# Patient Record
Sex: FEMALE | Race: WHITE | Employment: OTHER | URBAN - NONMETROPOLITAN AREA
[De-identification: names, ages, dates, MRNs, and addresses within clinical notes are randomized per-mention and may not be internally consistent; named-entity substitution may affect disease eponyms.]

---

## 2017-01-10 ENCOUNTER — PATIENT OUTREACH (OUTPATIENT)
Dept: FAMILY MEDICINE CLINIC | Facility: CLINIC | Age: 82
End: 2017-01-10

## 2017-01-27 ENCOUNTER — OFFICE VISIT (OUTPATIENT)
Dept: FAMILY MEDICINE CLINIC | Facility: CLINIC | Age: 82
End: 2017-01-27

## 2017-01-27 VITALS
SYSTOLIC BLOOD PRESSURE: 126 MMHG | HEIGHT: 62 IN | HEART RATE: 70 BPM | DIASTOLIC BLOOD PRESSURE: 74 MMHG | WEIGHT: 191 LBS | RESPIRATION RATE: 12 BRPM | TEMPERATURE: 98 F | BODY MASS INDEX: 35.15 KG/M2

## 2017-01-27 DIAGNOSIS — Z00.00 ENCOUNTER FOR ANNUAL HEALTH EXAMINATION: Primary | ICD-10-CM

## 2017-01-27 DIAGNOSIS — Z13.31 DEPRESSION SCREENING: ICD-10-CM

## 2017-01-27 DIAGNOSIS — Z13.39 SCREENING FOR ALCOHOL PROBLEM: ICD-10-CM

## 2017-01-27 DIAGNOSIS — Z87.81 HISTORY OF FEMUR FRACTURE: ICD-10-CM

## 2017-01-27 DIAGNOSIS — M15.9 PRIMARY OSTEOARTHRITIS INVOLVING MULTIPLE JOINTS: ICD-10-CM

## 2017-01-27 PROBLEM — M19.90 OSTEOARTHRITIS: Status: ACTIVE | Noted: 2017-01-27

## 2017-01-27 PROCEDURE — G0439 PPPS, SUBSEQ VISIT: HCPCS | Performed by: FAMILY MEDICINE

## 2017-01-27 PROCEDURE — G0447 BEHAVIOR COUNSEL OBESITY 15M: HCPCS | Performed by: FAMILY MEDICINE

## 2017-01-27 RX ORDER — MELOXICAM 7.5 MG/1
7.5 TABLET ORAL DAILY
Qty: 30 TABLET | Refills: 0 | Status: SHIPPED | OUTPATIENT
Start: 2017-01-27 | End: 2017-02-27

## 2017-01-27 NOTE — PATIENT INSTRUCTIONS
Natasha Emmanuel's SCREENING SCHEDULE   Tests on this list are recommended by your physician but may not be covered, or covered at this frequency, by your insurer. Please check with your insurance carrier before scheduling to verify coverage.    JESUS Screen  Covered every 5 years No results found for this or any previous visit. No flowsheet data found. Fecal Occult Blood   Covered Annually No results found for: FOB, OCCULTSTOOL No flowsheet data found.      Barium Enema-   uncomfortable but covered (Pneumovax)  Covered Once after 65 No orders found for this or any previous visit. Please get once after your 65th birthday    Hepatitis B for Moderate/High Risk       No orders found for this or any previous visit.  Medium/high risk factors:   End-stage re

## 2017-01-27 NOTE — PROGRESS NOTES
HPI:   Amauri Cabrera is a 80year old female who presents for a Medicare Subsequent Annual Wellness visit (Pt already had Initial Annual Wellness).       Her last annual assessment has been over 1 year: Annual Physical due on 01/14/2017         Does ha Multiple Vitamin (MULTI-VITAMIN DAILY) Oral Tab Take by mouth. MEDICAL INFORMATION:   She  has a past medical history of Encounter for long-term (current) use of other medications (6/9/2015); Iron deficiency anemia (6/9/2015);  History of femur fract Healthcare Power of  on file in [facility]:    Aniya Coulter does not have a Power of  for [employer] on file in [address] [place] [address] Rd.  Discussed with patient and provided information          PLAN:  The patient indicates understanding of these issues and a 0-No    Does pain affect your day to day activities?: 1-Yes     Have you had any memory issues?: 0-No (slight )    Fall/Risk Scorin          Depression Screening (PHQ-2/PHQ-9): Over the LAST 2 WEEKS   Little interest or pleasure in doing things (over t Screening      Dexascan Every two years No results found for this or any previous visit. No flowsheet data found.     Pap and Pelvic      Pap: Every 3 yrs age 21-65 or Pap+HPV every 5 yrs age 33-67, age 72 and older at high risk There are no preventive care data found. No flowsheet data found. COPD      Spirometry Testing Annually Spirometry date:  No flowsheet data found.          Template: [facility] AMB MEDICARE ANNUAL ASSESSMENT FEMALE [02854]  Aniya Coulter was screened today and had CAGE screening score

## 2017-02-27 ENCOUNTER — TELEPHONE (OUTPATIENT)
Dept: FAMILY MEDICINE CLINIC | Facility: CLINIC | Age: 82
End: 2017-02-27

## 2017-02-27 RX ORDER — GABAPENTIN 100 MG/1
CAPSULE ORAL
Qty: 540 CAPSULE | Refills: 1 | Status: SHIPPED | OUTPATIENT
Start: 2017-02-27 | End: 2017-05-19

## 2017-02-27 RX ORDER — MELOXICAM 7.5 MG/1
7.5 TABLET ORAL DAILY
Qty: 30 TABLET | Refills: 0 | Status: SHIPPED | OUTPATIENT
Start: 2017-02-27 | End: 2017-11-22 | Stop reason: ALTCHOICE

## 2017-02-27 RX ORDER — ROPINIROLE 1 MG/1
1 TABLET, FILM COATED ORAL
Qty: 90 TABLET | Refills: 1 | Status: SHIPPED | OUTPATIENT
Start: 2017-02-27 | End: 2017-05-31

## 2017-02-27 NOTE — TELEPHONE ENCOUNTER
Last office visit: 1/27/17  Last refill: Gabapentin 9/1/16 # 540 with 1 refill                 Ropinirole 9/1/16 # 90 with 1 refills                 Meloxicam 1/27/17 # 30 with 0 refills

## 2017-02-28 NOTE — TELEPHONE ENCOUNTER
Patient notified that we do not have any copies of the paperwork and asked the patient to drop off another copy of the paper. Patient upset but voiced understanding.  Advised to keep a copy for her records, when she drops this off next time, she states the

## 2017-03-02 NOTE — TELEPHONE ENCOUNTER
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 Detailed message left for patient, notifying her that the letter is ready.

## 2017-03-02 NOTE — TELEPHONE ENCOUNTER
Paper was given to another provider. Left message for patient to notify her that the paperwork was found and placed for Dr. Kirk Poe to review and write hardship letter.

## 2017-05-02 ENCOUNTER — TELEPHONE (OUTPATIENT)
Dept: FAMILY MEDICINE CLINIC | Facility: CLINIC | Age: 82
End: 2017-05-02

## 2017-05-02 NOTE — TELEPHONE ENCOUNTER
States she has had bronchitis x several weeks. This morning woke up and upper right side of chest and shoulder hurt. Hurts constantly. \"It just hurts. \"  More of an ache than a sharp pain. A pressure. No jaw pain. Some shortness of breath.   No sweat

## 2017-05-04 ENCOUNTER — OFFICE VISIT (OUTPATIENT)
Dept: FAMILY MEDICINE CLINIC | Facility: CLINIC | Age: 82
End: 2017-05-04

## 2017-05-04 VITALS
SYSTOLIC BLOOD PRESSURE: 130 MMHG | RESPIRATION RATE: 14 BRPM | WEIGHT: 192.5 LBS | DIASTOLIC BLOOD PRESSURE: 70 MMHG | HEART RATE: 64 BPM | OXYGEN SATURATION: 98 % | BODY MASS INDEX: 35.43 KG/M2 | HEIGHT: 62 IN | TEMPERATURE: 98 F

## 2017-05-04 DIAGNOSIS — J18.9 CAP (COMMUNITY ACQUIRED PNEUMONIA): Primary | ICD-10-CM

## 2017-05-04 PROCEDURE — 99214 OFFICE O/P EST MOD 30 MIN: CPT | Performed by: FAMILY MEDICINE

## 2017-05-04 RX ORDER — PREDNISONE 20 MG/1
TABLET ORAL
Qty: 15 TABLET | Refills: 0 | Status: SHIPPED | OUTPATIENT
Start: 2017-05-04 | End: 2017-05-31 | Stop reason: ALTCHOICE

## 2017-05-04 RX ORDER — LEVOFLOXACIN 500 MG/1
TABLET, FILM COATED ORAL
COMMUNITY
Start: 2017-05-02 | End: 2017-05-31 | Stop reason: ALTCHOICE

## 2017-05-04 NOTE — PROGRESS NOTES
Lance Zavala is a 80year old female. Patient presents with:  ER F/U: [facility] er for pneumonia. [facility] room 6      HPI:   Patient called earlier this week and had chest pain with difficulty breathing.   Because of the acuteness of this [facility] 6/9/2015   • Primary osteoarthritis of left hip 6/9/2015   • Restless leg syndrome 8/3/7425   • Systolic murmur 0/8/7547      Social History:    Smoking Status: Never Smoker                      Smokeless Status: Never Used Subtle patchy groundglass infiltrates including left midlung and right upper lobe. Correlate for pneumonia. No evidence for pleural effusions. Dextroscoliosis spondylosis thoracic spine. Extensive degenerative changes both shoulders.  No acute osseous abno [facility]   [address]  [address]    Back to top of Lab Results      Comp Metabolic Panel (68/05/0647 9:50 AM)  Comp Metabolic Panel (28/68/3917 9:50 AM)   Component Value Ref Range   Glucose 86 70 - 99 mg/dL   Urea Nitrogen 26 (H CK-MB 3.5 0.5 - 3.5 ng/mL   Relative Index 4       CK-MB,Immunologic (05/02/2017 9:50 AM)   Specimen Performing Laboratory   Blood, [facility]    [address]  [address], [facility] [address]    Back to top of Lab Results      Troponin (05/ 0      Si tabs daily 5 days 1 tab daily 5 days                     Farhat Alejandro M.D., FAAFP      The patient indicates understanding of these issues and agrees to the plan.

## 2017-05-04 NOTE — PATIENT INSTRUCTIONS
Pneumonia (Adult)  Pneumonia is an infection deep within the lungs. It is in the small air sacs (alveoli). Pneumonia may be caused by a virus or bacteria. Pneumonia caused by bacteria is usually treated with an antibiotic.  Severe cases may need to be carmella If you are 72 or older, you should get a pneumococcal vaccine and a yearly flu (influenza) shot. You should also get these vaccines if you have chronic lung disease like asthma, emphysema, or COPD. Ask your provider about this.   When to seek medical advice

## 2017-05-19 ENCOUNTER — HOSPITAL ENCOUNTER (OUTPATIENT)
Dept: GENERAL RADIOLOGY | Age: 82
Discharge: HOME | End: 2017-05-19
Attending: FAMILY MEDICINE
Payer: MEDICARE

## 2017-05-19 ENCOUNTER — OFFICE VISIT (OUTPATIENT)
Dept: FAMILY MEDICINE CLINIC | Facility: CLINIC | Age: 82
End: 2017-05-19

## 2017-05-19 VITALS
SYSTOLIC BLOOD PRESSURE: 138 MMHG | BODY MASS INDEX: 36 KG/M2 | DIASTOLIC BLOOD PRESSURE: 74 MMHG | TEMPERATURE: 98 F | WEIGHT: 195.25 LBS

## 2017-05-19 DIAGNOSIS — J18.9 CAP (COMMUNITY ACQUIRED PNEUMONIA): Primary | ICD-10-CM

## 2017-05-19 DIAGNOSIS — J18.9 CAP (COMMUNITY ACQUIRED PNEUMONIA): ICD-10-CM

## 2017-05-19 DIAGNOSIS — G60.9 IDIOPATHIC PERIPHERAL NEUROPATHY: ICD-10-CM

## 2017-05-19 PROCEDURE — 71020 XR CHEST PA + LAT CHEST (CPT=71020): CPT | Performed by: FAMILY MEDICINE

## 2017-05-19 PROCEDURE — 99214 OFFICE O/P EST MOD 30 MIN: CPT | Performed by: FAMILY MEDICINE

## 2017-05-19 RX ORDER — GABAPENTIN 300 MG/1
300 CAPSULE ORAL 3 TIMES DAILY
COMMUNITY
Start: 2017-05-19 | End: 2017-05-31

## 2017-05-22 NOTE — PROGRESS NOTES
Brenden Ackerman is a 80year old female. Patient presents with:   Follow - Up: room 5      HPI:   Here as requested for follow  Had pneumonia  Is off of meds    Is improving well    No issues        Review of records from [facility] tablet (7.5 mg total) by mouth daily. Disp: 30 tablet Rfl: 0     No current facility-administered medications on file prior to visit.      Past Medical History   Diagnosis Date   • Encounter for long-term (current) use of other medications 6/9/2015   • Iron infiltrate      ASSESSMENT AND PLAN:     Cap (community acquired pneumonia)  (primary encounter diagnosis)  Idiopathic peripheral neuropathy    Problem List Items Addressed This Visit        Unprioritized    Peripheral neuropathy ([facility]    Relevant [facility]

## 2017-05-31 ENCOUNTER — OFFICE VISIT (OUTPATIENT)
Dept: FAMILY MEDICINE CLINIC | Facility: CLINIC | Age: 82
End: 2017-05-31

## 2017-05-31 VITALS
HEART RATE: 62 BPM | DIASTOLIC BLOOD PRESSURE: 68 MMHG | BODY MASS INDEX: 35 KG/M2 | WEIGHT: 194 LBS | SYSTOLIC BLOOD PRESSURE: 120 MMHG | RESPIRATION RATE: 20 BRPM | TEMPERATURE: 99 F | OXYGEN SATURATION: 95 %

## 2017-05-31 DIAGNOSIS — M16.12 PRIMARY OSTEOARTHRITIS OF LEFT HIP: ICD-10-CM

## 2017-05-31 DIAGNOSIS — G25.81 RESTLESS LEG SYNDROME: ICD-10-CM

## 2017-05-31 DIAGNOSIS — J01.90 ACUTE RHINOSINUSITIS: Primary | ICD-10-CM

## 2017-05-31 DIAGNOSIS — G60.9 IDIOPATHIC PERIPHERAL NEUROPATHY: ICD-10-CM

## 2017-05-31 DIAGNOSIS — R05.9 COUGH: ICD-10-CM

## 2017-05-31 DIAGNOSIS — Z87.01 HISTORY OF PNEUMONIA: ICD-10-CM

## 2017-05-31 PROCEDURE — 99213 OFFICE O/P EST LOW 20 MIN: CPT | Performed by: FAMILY MEDICINE

## 2017-05-31 RX ORDER — AMOXICILLIN AND CLAVULANATE POTASSIUM 875; 125 MG/1; MG/1
1 TABLET, FILM COATED ORAL 2 TIMES DAILY
Qty: 20 TABLET | Refills: 0 | Status: SHIPPED | OUTPATIENT
Start: 2017-05-31 | End: 2017-06-10

## 2017-05-31 RX ORDER — PREDNISONE 20 MG/1
TABLET ORAL
Qty: 15 TABLET | Refills: 0 | Status: SHIPPED | OUTPATIENT
Start: 2017-05-31 | End: 2017-06-19 | Stop reason: ALTCHOICE

## 2017-05-31 RX ORDER — HYDROCODONE BITARTRATE AND ACETAMINOPHEN 5; 325 MG/1; MG/1
1 TABLET ORAL 2 TIMES DAILY PRN
Qty: 60 TABLET | Refills: 0 | Status: SHIPPED | OUTPATIENT
Start: 2017-05-31 | End: 2017-10-13

## 2017-05-31 RX ORDER — GABAPENTIN 300 MG/1
300 CAPSULE ORAL 3 TIMES DAILY
Qty: 90 CAPSULE | Refills: 2 | Status: SHIPPED | OUTPATIENT
Start: 2017-05-31 | End: 2017-07-10

## 2017-05-31 RX ORDER — ROPINIROLE 1 MG/1
1 TABLET, FILM COATED ORAL
Qty: 90 TABLET | Refills: 1 | Status: SHIPPED | OUTPATIENT
Start: 2017-05-31 | End: 2017-10-13

## 2017-05-31 NOTE — PROGRESS NOTES
HPI:   Alexis Wisdom is a 80year old female who presents for upper respiratory symptoms for  4  days.  Patient reports sore throat, congestion, cough with clear colored sputum, sinus pain, OTC cold meds have not been helping, prior history of bronc pneumonia  Was better 2 weeks  Now recurred  With cough    CARDIOVASCULAR: denies chest pain on exertion  GI: no nausea or abdominal pain  NEURO: denies headaches    EXAM:   /68 mmHg  Pulse 62  Temp(Src) 99.3 °F (37.4 °C) (Temporal)  Resp 20  Wt 194 days 1 tab daily 5 days      gabapentin 300 MG Oral Cap 90 capsule 2      Sig: Take 1 capsule (300 mg total) by mouth 3 (three) times daily. rOPINIRole HCl 1 MG Oral Tab 90 tablet 1      Sig: Take 1 tablet (1 mg total) by mouth once daily.       HYDROc

## 2017-06-16 ENCOUNTER — TELEPHONE (OUTPATIENT)
Dept: FAMILY MEDICINE CLINIC | Facility: CLINIC | Age: 82
End: 2017-06-16

## 2017-06-16 NOTE — TELEPHONE ENCOUNTER
Future Appointments  Date Time Provider Danae Loya   6/19/2017 1:40 PM Roxane Olguin MD [facility] [place]

## 2017-06-19 ENCOUNTER — OFFICE VISIT (OUTPATIENT)
Dept: FAMILY MEDICINE CLINIC | Facility: CLINIC | Age: 82
End: 2017-06-19

## 2017-06-19 VITALS
WEIGHT: 194.5 LBS | DIASTOLIC BLOOD PRESSURE: 70 MMHG | TEMPERATURE: 99 F | BODY MASS INDEX: 36 KG/M2 | SYSTOLIC BLOOD PRESSURE: 120 MMHG

## 2017-06-19 DIAGNOSIS — M62.838 TRAPEZIUS MUSCLE SPASM: ICD-10-CM

## 2017-06-19 DIAGNOSIS — G89.29 CHRONIC RIGHT SHOULDER PAIN: Primary | ICD-10-CM

## 2017-06-19 DIAGNOSIS — M25.511 CHRONIC RIGHT SHOULDER PAIN: Primary | ICD-10-CM

## 2017-06-19 PROCEDURE — 99213 OFFICE O/P EST LOW 20 MIN: CPT | Performed by: FAMILY MEDICINE

## 2017-06-19 RX ORDER — CYCLOBENZAPRINE HCL 10 MG
10 TABLET ORAL 3 TIMES DAILY
Qty: 30 TABLET | Refills: 1 | Status: SHIPPED | OUTPATIENT
Start: 2017-06-19 | End: 2017-07-09

## 2017-06-19 NOTE — PROGRESS NOTES
Jie Sewell is a 80year old female. Patient presents with:  Neck Pain: Neck and shoulder pain. . noticed a few week ago. . room 6      HPI:   Here for eval  Has had pain  Wakes in the morning    Has pain daily  Chronic cramp  Looking for relief HEALTH: feels well no complaints  SKIN: denies any unusual skin lesions or rashes  MUSC  see HPI    NEURO: denies headaches    EXAM:   /70 mmHg  Temp(Src) 98.5 °F (36.9 °C) (Temporal)  Wt 194 lb 8 oz Body mass index is 35.57 kg/(m^2).       GENERAL: w

## 2017-06-22 NOTE — PATIENT INSTRUCTIONS
Neck Spasm    A spasm of the neck muscles can happen after a sudden awkward neck movement. Sleeping with your neck in a crooked position can also cause spasm.  Some people respond to emotional stress by tensing the muscles of their neck, shoulders, and up Follow up with your healthcare provider if your symptoms do not show signs of improvement after one week. Physical therapy or further tests may be needed.   If X-rays, CT scans, or MRI scans were taken, you will be told of any new findings that may affect y

## 2017-06-23 ENCOUNTER — TELEPHONE (OUTPATIENT)
Dept: FAMILY MEDICINE CLINIC | Facility: CLINIC | Age: 82
End: 2017-06-23

## 2017-07-07 ENCOUNTER — TELEPHONE (OUTPATIENT)
Dept: FAMILY MEDICINE CLINIC | Facility: CLINIC | Age: 82
End: 2017-07-07

## 2017-07-07 NOTE — TELEPHONE ENCOUNTER
Called all numbers listed for pt:  [phone] automated servis=ce states number is not a working number  [phone] is disconnected    Will try later

## 2017-07-10 ENCOUNTER — OFFICE VISIT (OUTPATIENT)
Dept: FAMILY MEDICINE CLINIC | Facility: CLINIC | Age: 82
End: 2017-07-10

## 2017-07-10 VITALS
TEMPERATURE: 99 F | OXYGEN SATURATION: 94 % | WEIGHT: 196 LBS | SYSTOLIC BLOOD PRESSURE: 120 MMHG | DIASTOLIC BLOOD PRESSURE: 68 MMHG | HEART RATE: 70 BPM | BODY MASS INDEX: 36 KG/M2

## 2017-07-10 DIAGNOSIS — I35.0 AORTIC STENOSIS, MILD: ICD-10-CM

## 2017-07-10 DIAGNOSIS — T50.905D MEDICATION SIDE EFFECT, SUBSEQUENT ENCOUNTER: Primary | ICD-10-CM

## 2017-07-10 DIAGNOSIS — N39.0 UTI DUE TO KLEBSIELLA SPECIES: ICD-10-CM

## 2017-07-10 DIAGNOSIS — R53.83 OTHER FATIGUE: ICD-10-CM

## 2017-07-10 DIAGNOSIS — G60.9 IDIOPATHIC PERIPHERAL NEUROPATHY: ICD-10-CM

## 2017-07-10 DIAGNOSIS — B96.89 UTI DUE TO KLEBSIELLA SPECIES: ICD-10-CM

## 2017-07-10 PROCEDURE — 99214 OFFICE O/P EST MOD 30 MIN: CPT | Performed by: FAMILY MEDICINE

## 2017-07-10 RX ORDER — GABAPENTIN 100 MG/1
100 CAPSULE ORAL 3 TIMES DAILY
Qty: 90 CAPSULE | Refills: 2 | Status: SHIPPED | OUTPATIENT
Start: 2017-07-10 | End: 2017-10-13

## 2017-07-10 NOTE — PATIENT INSTRUCTIONS
Gabapentin capsules or tablets  What is this medicine? GABAPENTIN (GA ba pen tin) is used to control partial seizures in adults with epilepsy. It is also used to treat certain types of nerve pain. How should I use this medicine?   Take this medicine by · certain medicines for anxiety or sleep  · certain medicines for depression or psychotic disturbances  · homatropine; hydrocodone  · naproxen  · narcotic medicines (opiates) for pain  · phenothiazines like chlorpromazine, mesoridazine, prochlorperazine, t Visit your doctor or health care professional for regular checks on your progress. You may want to keep a record at home of how you feel your condition is responding to treatment.  You may want to share this information with your doctor or health care profe NOTE:This sheet is a summary. It may not cover all possible information. If you have questions about this medicine, talk to your doctor, pharmacist, or health care provider.  Copyright© 2016 [employer]

## 2017-07-10 NOTE — PROGRESS NOTES
Brenda Trevizo is a 80year old female. Patient presents with:  Hospital F/U: weakness/fatigue. Brenda Spar discuss meds. .no appetite. .  dry mouth. . numbness in finger tips and wrist and left knee swelling. .  room 6      HPI:   Here to be evaluated, she is he aortic stenosis. There is mild aortic regurgitation. 6. There is mild mitral regurgitation. There is mitral annular  calcification. 7. There is mild tricuspid regurgitation. 8. There is no pericardial effusion.   9. There is evidence of pulmonary hypert Pericardium:  There is no pericardial effusion. Measurements   Chambers MM  Name Value Normal Range   IVSd (MM) . 50 cm -   LVIDd (MM) 6.14 cm -   LVPWd (MM) . 87 cm -   IVSs (MM) 1.55 cm -   LVIDs (MM) 4.31 cm -   LVPWs (MM) 1.43 cm -   EF Luis Rfl: 0   Cholecalciferol (VITAMIN D3) 1000 UNITS Oral Cap Take 1 tablet by mouth daily. Disp:  Rfl:    Multiple Vitamin (MULTI-VITAMIN DAILY) Oral Tab Take by mouth. Disp:  Rfl:      No current facility-administered medications on file prior to visit. side effect, subsequent encounter  (primary encounter diagnosis)  Uti due to klebsiella species  Aortic stenosis, mild  Other fatigue likely due to medication    Problem List Items Addressed This Visit        Neurologic    Peripheral neuropathy ([facility]    Re

## 2017-07-12 ENCOUNTER — MED REC SCAN ONLY (OUTPATIENT)
Dept: FAMILY MEDICINE CLINIC | Facility: CLINIC | Age: 82
End: 2017-07-12

## 2017-07-13 NOTE — TELEPHONE ENCOUNTER
Patient states that she stopped her Gabapentin and her symptoms have resolved and she feels much better. KYUNG to DR. Olguin.

## 2017-08-18 ENCOUNTER — LAB ENCOUNTER (OUTPATIENT)
Dept: LAB | Age: 82
End: 2017-08-18
Attending: FAMILY MEDICINE
Payer: MEDICARE

## 2017-08-18 ENCOUNTER — OFFICE VISIT (OUTPATIENT)
Dept: FAMILY MEDICINE CLINIC | Facility: CLINIC | Age: 82
End: 2017-08-18

## 2017-08-18 VITALS
BODY MASS INDEX: 34 KG/M2 | SYSTOLIC BLOOD PRESSURE: 138 MMHG | DIASTOLIC BLOOD PRESSURE: 64 MMHG | WEIGHT: 184.25 LBS | TEMPERATURE: 99 F

## 2017-08-18 DIAGNOSIS — R53.83 FATIGUE, UNSPECIFIED TYPE: Primary | ICD-10-CM

## 2017-08-18 DIAGNOSIS — R53.83 FATIGUE, UNSPECIFIED TYPE: ICD-10-CM

## 2017-08-18 DIAGNOSIS — N18.30 CKD (CHRONIC KIDNEY DISEASE) STAGE 3, GFR 30-59 ML/MIN (HCC): ICD-10-CM

## 2017-08-18 DIAGNOSIS — G60.9 IDIOPATHIC PERIPHERAL NEUROPATHY: ICD-10-CM

## 2017-08-18 DIAGNOSIS — M15.9 PRIMARY OSTEOARTHRITIS INVOLVING MULTIPLE JOINTS: ICD-10-CM

## 2017-08-18 LAB
25-HYDROXYVITAMIN D (TOTAL): 69.1 NG/ML (ref 30–100)
ALBUMIN SERPL-MCNC: 3.3 G/DL (ref 3.5–4.8)
ALP LIVER SERPL-CCNC: 76 U/L (ref 55–142)
ALT SERPL-CCNC: 20 U/L (ref 14–54)
AST SERPL-CCNC: 27 U/L (ref 15–41)
BASOPHILS # BLD AUTO: 0.05 X10(3) UL (ref 0–0.1)
BASOPHILS NFR BLD AUTO: 0.7 %
BILIRUB SERPL-MCNC: 0.5 MG/DL (ref 0.1–2)
BUN BLD-MCNC: 18 MG/DL (ref 8–20)
CALCIUM BLD-MCNC: 9.9 MG/DL (ref 8.3–10.3)
CHLORIDE: 105 MMOL/L (ref 101–111)
CO2: 26 MMOL/L (ref 22–32)
CREAT BLD-MCNC: 1.11 MG/DL (ref 0.55–1.02)
EOSINOPHIL # BLD AUTO: 0.19 X10(3) UL (ref 0–0.3)
EOSINOPHIL NFR BLD AUTO: 2.6 %
ERYTHROCYTE [DISTWIDTH] IN BLOOD BY AUTOMATED COUNT: 14.9 % (ref 11.5–16)
GLUCOSE BLD-MCNC: 78 MG/DL (ref 70–99)
HAV AB SERPL IA-ACNC: 1383 PG/ML (ref 193–986)
HCT VFR BLD AUTO: 37.1 % (ref 34–50)
HGB BLD-MCNC: 11.7 G/DL (ref 12–16)
IMMATURE GRANULOCYTE COUNT: 0.01 X10(3) UL (ref 0–1)
IMMATURE GRANULOCYTE RATIO %: 0.1 %
LYMPHOCYTES # BLD AUTO: 1.44 X10(3) UL (ref 0.9–4)
LYMPHOCYTES NFR BLD AUTO: 20.1 %
M PROTEIN MFR SERPL ELPH: 7.1 G/DL (ref 6.1–8.3)
MCH RBC QN AUTO: 32.2 PG (ref 27–33.2)
MCHC RBC AUTO-ENTMCNC: 31.5 G/DL (ref 31–37)
MCV RBC AUTO: 102.2 FL (ref 81–100)
MONOCYTES # BLD AUTO: 0.54 X10(3) UL (ref 0.1–0.6)
MONOCYTES NFR BLD AUTO: 7.5 %
NEUTROPHIL ABS PRELIM: 4.95 X10 (3) UL (ref 1.3–6.7)
NEUTROPHILS # BLD AUTO: 4.95 X10(3) UL (ref 1.3–6.7)
NEUTROPHILS NFR BLD AUTO: 69 %
PLATELET # BLD AUTO: 230 10(3)UL (ref 150–450)
POTASSIUM SERPL-SCNC: 4.2 MMOL/L (ref 3.6–5.1)
RBC # BLD AUTO: 3.63 X10(6)UL (ref 3.8–5.1)
RED CELL DISTRIBUTION WIDTH-SD: 56.5 FL (ref 35.1–46.3)
SODIUM SERPL-SCNC: 139 MMOL/L (ref 136–144)
TSI SER-ACNC: 1.11 MIU/ML (ref 0.35–5.5)
WBC # BLD AUTO: 7.2 X10(3) UL (ref 4–13)

## 2017-08-18 PROCEDURE — 84443 ASSAY THYROID STIM HORMONE: CPT

## 2017-08-18 PROCEDURE — 36415 COLL VENOUS BLD VENIPUNCTURE: CPT

## 2017-08-18 PROCEDURE — 82607 VITAMIN B-12: CPT

## 2017-08-18 PROCEDURE — 80053 COMPREHEN METABOLIC PANEL: CPT

## 2017-08-18 PROCEDURE — 82306 VITAMIN D 25 HYDROXY: CPT

## 2017-08-18 PROCEDURE — 99214 OFFICE O/P EST MOD 30 MIN: CPT | Performed by: FAMILY MEDICINE

## 2017-08-18 PROCEDURE — 85025 COMPLETE CBC W/AUTO DIFF WBC: CPT

## 2017-08-21 NOTE — PATIENT INSTRUCTIONS
Osteoarthritis: Tips for Daily Living     Lift items with both hands. Making a few changes in your daily life can reduce stress on your joints. This helps protect the joints from further damage.   Your surroundings  Make your home work for you:  · Alondra Chow © 0011-0227 [address], [address]. All rights reserved. This information is not intended as a substitute for professional medical care. Always follow your healthcare professional's instructions.

## 2017-08-21 NOTE — PROGRESS NOTES
Kayden Ritter is a 80year old female. Patient presents with:   Follow - Up: room 1      HPI:   Here for follow    Now feels well  Though she is tired    She states she has diff waking in the norning    But she has energy during the day    She stat oz  05/31/17 : 194 lb  05/19/17 : 195 lb 4 oz  05/04/17 : 192 lb 8 oz      REVIEW OF SYSTEMS:   GENERAL HEALTH: feels well no complaints  SKIN: denies any unusual skin lesions or rashes  RESPIRATORY: denies shortness of breath with exertion  CARDIOVASCULAR orders of the defined types were placed in this encounter.         Orders Placed This Encounter      CBC With Diff      CMP      TSH W Reflex To Free T4      Vitamin D, 25-Hydroxy      Vitamin B12            Meds & Refills for this Visit:  No prescriptions

## 2017-09-11 ENCOUNTER — MED REC SCAN ONLY (OUTPATIENT)
Dept: FAMILY MEDICINE CLINIC | Facility: CLINIC | Age: 82
End: 2017-09-11

## 2017-10-13 DIAGNOSIS — M16.12 PRIMARY OSTEOARTHRITIS OF LEFT HIP: ICD-10-CM

## 2017-10-13 DIAGNOSIS — G60.9 IDIOPATHIC PERIPHERAL NEUROPATHY: ICD-10-CM

## 2017-10-13 DIAGNOSIS — G25.81 RESTLESS LEG SYNDROME: ICD-10-CM

## 2017-10-13 RX ORDER — GABAPENTIN 100 MG/1
100 CAPSULE ORAL 3 TIMES DAILY
Qty: 90 CAPSULE | Refills: 2 | Status: SHIPPED | OUTPATIENT
Start: 2017-10-13 | End: 2020-04-30

## 2017-10-13 RX ORDER — HYDROCODONE BITARTRATE AND ACETAMINOPHEN 5; 325 MG/1; MG/1
1 TABLET ORAL 2 TIMES DAILY PRN
Qty: 60 TABLET | Refills: 0 | Status: SHIPPED | OUTPATIENT
Start: 2017-10-13 | End: 2018-03-09

## 2017-10-13 RX ORDER — ROPINIROLE 1 MG/1
1 TABLET, FILM COATED ORAL
Qty: 90 TABLET | Refills: 1 | Status: SHIPPED | OUTPATIENT
Start: 2017-10-13 | End: 2018-07-16

## 2017-10-13 NOTE — TELEPHONE ENCOUNTER
Last OV: 8/18/2017  Ropinirole: 5/31/2017 #90 w/ 1RF  Norco: 5/31/2017 #60 no RF  Gabapentin: 7/10/2017 #90 w/ 2RF

## 2017-10-13 NOTE — TELEPHONE ENCOUNTER
Patient notified and verbalized understanding of the information provided  Script placed up front for

## 2017-10-17 ENCOUNTER — IMMUNIZATION (OUTPATIENT)
Dept: FAMILY MEDICINE CLINIC | Facility: CLINIC | Age: 82
End: 2017-10-17

## 2017-10-17 PROCEDURE — 90653 IIV ADJUVANT VACCINE IM: CPT | Performed by: FAMILY MEDICINE

## 2017-10-17 PROCEDURE — G0008 ADMIN INFLUENZA VIRUS VAC: HCPCS | Performed by: FAMILY MEDICINE

## 2017-11-20 ENCOUNTER — TELEPHONE (OUTPATIENT)
Dept: FAMILY MEDICINE CLINIC | Facility: CLINIC | Age: 82
End: 2017-11-20

## 2017-11-20 NOTE — TELEPHONE ENCOUNTER
Advised pt that she has refills remaining at the pharmacy and that she would need to contact them in regards to the medications

## 2017-11-22 ENCOUNTER — OFFICE VISIT (OUTPATIENT)
Dept: FAMILY MEDICINE CLINIC | Facility: CLINIC | Age: 82
End: 2017-11-22

## 2017-11-22 VITALS
WEIGHT: 189 LBS | SYSTOLIC BLOOD PRESSURE: 124 MMHG | TEMPERATURE: 100 F | HEART RATE: 105 BPM | BODY MASS INDEX: 35 KG/M2 | OXYGEN SATURATION: 96 % | DIASTOLIC BLOOD PRESSURE: 60 MMHG

## 2017-11-22 DIAGNOSIS — J40 BRONCHITIS: ICD-10-CM

## 2017-11-22 DIAGNOSIS — J01.90 ACUTE RHINOSINUSITIS: ICD-10-CM

## 2017-11-22 DIAGNOSIS — R06.02 SHORTNESS OF BREATH: Primary | ICD-10-CM

## 2017-11-22 DIAGNOSIS — R05.9 COUGH: ICD-10-CM

## 2017-11-22 PROCEDURE — 99214 OFFICE O/P EST MOD 30 MIN: CPT | Performed by: FAMILY MEDICINE

## 2017-11-22 RX ORDER — PREDNISONE 20 MG/1
TABLET ORAL
Qty: 15 TABLET | Refills: 0 | Status: SHIPPED | OUTPATIENT
Start: 2017-11-22 | End: 2018-01-16 | Stop reason: ALTCHOICE

## 2017-11-22 RX ORDER — CEFPROZIL 250 MG/1
250 TABLET, FILM COATED ORAL 2 TIMES DAILY
Qty: 20 TABLET | Refills: 0 | Status: SHIPPED | OUTPATIENT
Start: 2017-11-22 | End: 2018-01-16 | Stop reason: ALTCHOICE

## 2017-11-22 NOTE — PROGRESS NOTES
Saw Medina is a 80year old female. Patient presents with: Other: cough, chest congestion-has been taking mucinex-started on 11/18. ...room 1      HPI:   Patient was brought in by her daughter.   Patient complains of cough, congestion, shortness or abdominal pain   NEURO: denies headaches    EXAM:   /60   Pulse 105   Temp 100 °F (37.8 °C) (Tympanic)   Wt 189 lb   SpO2 96%   BMI 34.57 kg/m²   GENERAL: well developed, well nourished,in no apparent distress  SKIN: no rashes,no suspicious lesion

## 2018-01-16 ENCOUNTER — OFFICE VISIT (OUTPATIENT)
Dept: FAMILY MEDICINE CLINIC | Facility: CLINIC | Age: 83
End: 2018-01-16

## 2018-01-16 VITALS
WEIGHT: 194 LBS | BODY MASS INDEX: 35 KG/M2 | DIASTOLIC BLOOD PRESSURE: 70 MMHG | HEART RATE: 58 BPM | SYSTOLIC BLOOD PRESSURE: 128 MMHG | OXYGEN SATURATION: 98 % | TEMPERATURE: 98 F

## 2018-01-16 DIAGNOSIS — J01.90 ACUTE RHINOSINUSITIS: Primary | ICD-10-CM

## 2018-01-16 DIAGNOSIS — R05.9 COUGH: ICD-10-CM

## 2018-01-16 PROCEDURE — 99213 OFFICE O/P EST LOW 20 MIN: CPT | Performed by: FAMILY MEDICINE

## 2018-01-16 RX ORDER — LEVOFLOXACIN 500 MG/1
500 TABLET, FILM COATED ORAL DAILY
Qty: 10 TABLET | Refills: 0 | Status: SHIPPED | OUTPATIENT
Start: 2018-01-16 | End: 2018-01-24

## 2018-01-16 RX ORDER — GABAPENTIN 100 MG/1
100 CAPSULE ORAL 3 TIMES DAILY
Qty: 90 CAPSULE | Refills: 5 | Status: SHIPPED | OUTPATIENT
Start: 2018-01-16 | End: 2018-06-22

## 2018-01-16 RX ORDER — GABAPENTIN 100 MG/1
100 CAPSULE ORAL 3 TIMES DAILY
COMMUNITY
End: 2018-01-16

## 2018-01-16 RX ORDER — PREDNISONE 20 MG/1
TABLET ORAL
Qty: 15 TABLET | Refills: 0 | Status: SHIPPED | OUTPATIENT
Start: 2018-01-16 | End: 2018-01-29 | Stop reason: ALTCHOICE

## 2018-01-16 NOTE — PROGRESS NOTES
HPI:   Aretha Monterroso is a 80year old female who presents for upper respiratory symptoms for  4  days.  Patient reports sore throat, congestion, cough with yellowish colored sputum, cough is keeping pt up at night, wheezing, OTC cold meds have not b shortness of breath with exertion  see HPI    CARDIOVASCULAR: denies chest pain on exertion  GI: no nausea or abdominal pain  NEURO: denies headaches    EXAM:   /70   Pulse 58   Temp 98.3 °F (36.8 °C) (Temporal)   Wt 194 lb   SpO2 98%   BMI 35.48 kg/

## 2018-01-24 ENCOUNTER — TELEPHONE (OUTPATIENT)
Dept: FAMILY MEDICINE CLINIC | Facility: CLINIC | Age: 83
End: 2018-01-24

## 2018-01-24 DIAGNOSIS — J01.90 ACUTE RHINOSINUSITIS: ICD-10-CM

## 2018-01-24 DIAGNOSIS — R05.9 COUGH: ICD-10-CM

## 2018-01-24 RX ORDER — LEVOFLOXACIN 500 MG/1
500 TABLET, FILM COATED ORAL DAILY
Qty: 10 TABLET | Refills: 0 | Status: SHIPPED | OUTPATIENT
Start: 2018-01-24 | End: 2018-01-29 | Stop reason: ALTCHOICE

## 2018-01-24 NOTE — TELEPHONE ENCOUNTER
Pt states that she started to feel better but now feels the symptoms are coming back. Pt states \"I think a refill will help me\". Pt is c/o congestions, hoarseness w/ a mild ST. Pt denies: fever, productive cough, difficulty breathing or wheezing.  Pt stat

## 2018-01-29 ENCOUNTER — OFFICE VISIT (OUTPATIENT)
Dept: FAMILY MEDICINE CLINIC | Facility: CLINIC | Age: 83
End: 2018-01-29

## 2018-01-29 VITALS
WEIGHT: 194.25 LBS | DIASTOLIC BLOOD PRESSURE: 60 MMHG | HEART RATE: 64 BPM | BODY MASS INDEX: 36 KG/M2 | TEMPERATURE: 99 F | OXYGEN SATURATION: 98 % | SYSTOLIC BLOOD PRESSURE: 120 MMHG

## 2018-01-29 DIAGNOSIS — Z00.00 ENCOUNTER FOR ANNUAL HEALTH EXAMINATION: ICD-10-CM

## 2018-01-29 DIAGNOSIS — Z23 NEED FOR VACCINATION: ICD-10-CM

## 2018-01-29 PROCEDURE — 90732 PPSV23 VACC 2 YRS+ SUBQ/IM: CPT | Performed by: FAMILY MEDICINE

## 2018-01-29 PROCEDURE — G0009 ADMIN PNEUMOCOCCAL VACCINE: HCPCS | Performed by: FAMILY MEDICINE

## 2018-01-29 PROCEDURE — G0439 PPPS, SUBSEQ VISIT: HCPCS | Performed by: FAMILY MEDICINE

## 2018-01-29 NOTE — PATIENT INSTRUCTIONS
Tony Emmanuel's SCREENING SCHEDULE   Tests on this list are recommended by your physician but may not be covered, or covered at this frequency, by your insurer. Please check with your insurance carrier before scheduling to verify coverage.    PREV applicable    Flex Sigmoidoscopy Screen  Covered every 5 years No results found for this or any previous visit. No flowsheet data found. Fecal Occult Blood   Covered Annually No results found for: FOB, OCCULTSTOOL No flowsheet data found.      Barium En IM    Please get once after your 65th birthday    Pneumococcal 23 (Pneumovax)  Covered Once after 65   Orders placed or performed in visit on 01/29/18  -PNEUMOCOCCAL IMM (PNEUMOVAX)    Please get once after your 65th birthday    Hepatitis B for Moderate/Hi

## 2018-01-31 ENCOUNTER — TELEPHONE (OUTPATIENT)
Dept: FAMILY MEDICINE CLINIC | Facility: CLINIC | Age: 83
End: 2018-01-31

## 2018-01-31 DIAGNOSIS — R05.9 COUGH: ICD-10-CM

## 2018-01-31 DIAGNOSIS — J01.90 ACUTE RHINOSINUSITIS: ICD-10-CM

## 2018-01-31 RX ORDER — LEVOFLOXACIN 500 MG/1
500 TABLET, FILM COATED ORAL DAILY
Qty: 10 TABLET | Refills: 0 | Status: SHIPPED | OUTPATIENT
Start: 2018-01-31 | End: 2018-02-10

## 2018-01-31 NOTE — TELEPHONE ENCOUNTER
SEEN ON Monday AND THOUGHT HE WAS GOING TO SEND IN SCRIPT TO [facility] FOR LEVOTHYROXINE? [facility] DOES NOT HAVE ANYTHING FOR HER.   PLEASE ADVISE

## 2018-01-31 NOTE — TELEPHONE ENCOUNTER
Pt states that Dr. Anna العلي was going to call in a refill for levaquin but the pharmacy does not have the refill  Dr. Anna العلي advised and v/o to send refill of Levaquin  Med sent  Patient notified and verbalized understanding of the information provided

## 2018-03-09 DIAGNOSIS — M16.12 PRIMARY OSTEOARTHRITIS OF LEFT HIP: ICD-10-CM

## 2018-03-09 RX ORDER — HYDROCODONE BITARTRATE AND ACETAMINOPHEN 5; 325 MG/1; MG/1
1 TABLET ORAL 2 TIMES DAILY PRN
Qty: 60 TABLET | Refills: 0 | Status: SHIPPED | OUTPATIENT
Start: 2018-03-09 | End: 2018-06-28

## 2018-03-09 NOTE — TELEPHONE ENCOUNTER
[facility] QUER[facility] AND VERIFIED TODAY

## 2018-04-20 ENCOUNTER — OFFICE VISIT (OUTPATIENT)
Dept: FAMILY MEDICINE CLINIC | Facility: CLINIC | Age: 83
End: 2018-04-20

## 2018-04-20 VITALS
SYSTOLIC BLOOD PRESSURE: 136 MMHG | HEART RATE: 64 BPM | OXYGEN SATURATION: 98 % | BODY MASS INDEX: 34 KG/M2 | DIASTOLIC BLOOD PRESSURE: 70 MMHG | WEIGHT: 188 LBS | TEMPERATURE: 98 F

## 2018-04-20 DIAGNOSIS — R82.81 PYURIA: Primary | ICD-10-CM

## 2018-04-20 DIAGNOSIS — R30.0 DYSURIA: ICD-10-CM

## 2018-04-20 PROCEDURE — 87086 URINE CULTURE/COLONY COUNT: CPT | Performed by: FAMILY MEDICINE

## 2018-04-20 PROCEDURE — 87077 CULTURE AEROBIC IDENTIFY: CPT | Performed by: FAMILY MEDICINE

## 2018-04-20 PROCEDURE — 99213 OFFICE O/P EST LOW 20 MIN: CPT | Performed by: FAMILY MEDICINE

## 2018-04-20 PROCEDURE — 81003 URINALYSIS AUTO W/O SCOPE: CPT | Performed by: FAMILY MEDICINE

## 2018-04-20 RX ORDER — SULFAMETHOXAZOLE AND TRIMETHOPRIM 800; 160 MG/1; MG/1
1 TABLET ORAL 2 TIMES DAILY
Qty: 14 TABLET | Refills: 0 | Status: SHIPPED | OUTPATIENT
Start: 2018-04-20 | End: 2018-04-27

## 2018-04-20 NOTE — PROGRESS NOTES
Ha Erickson is a 80year old female. Patient presents with:  Burning On Urination: urgency with urination, burning also. . started a few weeks ago. .. room 6    Subjective   HPI:   Has 1-2 weeks that she has been dealing with some urgency with uri : 120/60  01/16/18 : 128/70  11/22/17 : 124/60  08/18/17 : 138/64  07/10/17 : 120/68      Wt Readings from Last 6 Encounters:  04/20/18 : 188 lb  01/29/18 : 194 lb 4 oz  01/16/18 : 194 lb  11/22/17 : 189 lb  08/18/17 : 184 lb 4 oz  07/10/17 : 196 lb      R UROBILINOGEN,SEMI-QN 0.2 0.0 - 1.9 mg/dL   NITRITE, URINE Negative Negative   LEUKOCYTES Large Negative   APPEARANCE Clear Clear   URINE-COLOR Yellow Yellow   Multistix Lot# [id number] Numeric   Multistix Expiration Date 9/18 Date         Return if symptoms

## 2018-04-23 ENCOUNTER — TELEPHONE (OUTPATIENT)
Dept: FAMILY MEDICINE CLINIC | Facility: CLINIC | Age: 83
End: 2018-04-23

## 2018-04-23 RX ORDER — CEPHALEXIN 250 MG/1
250 CAPSULE ORAL 2 TIMES DAILY
Qty: 20 CAPSULE | Refills: 0 | Status: SHIPPED | OUTPATIENT
Start: 2018-04-23 | End: 2018-06-28 | Stop reason: ALTCHOICE

## 2018-04-23 NOTE — TELEPHONE ENCOUNTER
Pt states that she is having terrible weakness from the Bactrim DS. Per Dr. Olguin: v/o for keflex 250mg BID x 10 days.    Patient notified and verbalized understanding of the information provided

## 2018-05-21 ENCOUNTER — TELEPHONE (OUTPATIENT)
Dept: FAMILY MEDICINE CLINIC | Facility: CLINIC | Age: 83
End: 2018-05-21

## 2018-05-21 NOTE — TELEPHONE ENCOUNTER
Advised pt she can come in anytime for a nurse appointment.  Patient notified and verbalized understanding of the information provided  Future Appointments  Date Time Provider Danae Loya   5/22/2018 9:00 AM [facility] [place]

## 2018-05-21 NOTE — TELEPHONE ENCOUNTER
UTI, CAN SHE BE SEEN TOMORROW AROUND 9:30?  NO OPENINGS, BUT SHE NEEDS TO GET A RIDE FROM THE BUS & THIS TIME WOULD WORK WELL

## 2018-05-22 ENCOUNTER — NURSE ONLY (OUTPATIENT)
Dept: FAMILY MEDICINE CLINIC | Facility: CLINIC | Age: 83
End: 2018-05-22

## 2018-05-22 DIAGNOSIS — R35.0 FREQUENCY OF URINATION: Primary | ICD-10-CM

## 2018-05-22 PROCEDURE — 87086 URINE CULTURE/COLONY COUNT: CPT | Performed by: FAMILY MEDICINE

## 2018-05-22 PROCEDURE — 81003 URINALYSIS AUTO W/O SCOPE: CPT | Performed by: FAMILY MEDICINE

## 2018-05-22 PROCEDURE — 87077 CULTURE AEROBIC IDENTIFY: CPT | Performed by: FAMILY MEDICINE

## 2018-05-22 RX ORDER — NITROFURANTOIN 25; 75 MG/1; MG/1
100 CAPSULE ORAL 2 TIMES DAILY
Qty: 20 CAPSULE | Refills: 0 | Status: SHIPPED | OUTPATIENT
Start: 2018-05-22 | End: 2018-06-28 | Stop reason: ALTCHOICE

## 2018-06-04 ENCOUNTER — TELEPHONE (OUTPATIENT)
Dept: FAMILY MEDICINE CLINIC | Facility: CLINIC | Age: 83
End: 2018-06-04

## 2018-06-04 NOTE — TELEPHONE ENCOUNTER
Jevon at [facility] advised ok to change per Dr. Vinod Mistry.  Pb Sierra read back order for clarity

## 2018-06-04 NOTE — TELEPHONE ENCOUNTER
New admit to nursing home. Came from hospital with orders for Keeler 5/325 1 tab. Patient had been taking Norco 5/325 1/2 tablet prn when she was at home.  Haroon Babcock is requesting a revised order for the 1/2 tablet

## 2018-06-05 ENCOUNTER — TELEPHONE (OUTPATIENT)
Dept: FAMILY MEDICINE CLINIC | Facility: CLINIC | Age: 83
End: 2018-06-05

## 2018-06-05 NOTE — TELEPHONE ENCOUNTER
Senthil Torres at [facility] advised and verbalized understanding of the information provided

## 2018-06-05 NOTE — TELEPHONE ENCOUNTER
SONJA IS MENTALLY READY TO \"GIVE UP\" GOING TO THE BATHROOM EVERY HOUR, ANA THINKS SHE HAS A UTI. ANA WOULD LIKE TO SPEAK TO THE NURSE.   HE ALSO WANTS TO KNOW WHEN DR TESFAYE COULD GET UP TO SEE HER AT [facility]

## 2018-06-05 NOTE — TELEPHONE ENCOUNTER
Fax received from [facility]: [place], RN: May we have an order for UA and C&S. Pt is having frequent urination every 30 minutes with pain and small amounts. [id number] Bren Calero for requested labs? ?      Pt was admitted to [facility] after discharge from [facility] [address] for UTI

## 2018-06-05 NOTE — TELEPHONE ENCOUNTER
Spoke to son, Ceasar Carvalho, who is very concerned about pt having another UTI. Ceasar Carvalho states that pt is urinating every 30 minutes and is c/o pain. Alison Cashs that an order was placed for a UA and C&S and that once results are received we.  Ceasar Carvalho is more concern

## 2018-06-08 ENCOUNTER — TELEPHONE (OUTPATIENT)
Dept: FAMILY MEDICINE CLINIC | Facility: CLINIC | Age: 83
End: 2018-06-08

## 2018-06-08 NOTE — TELEPHONE ENCOUNTER
Son  calls states she is in nursing home, [address]. She was seen @ [facility]/ today for some kind of urinary diagnostic test ordered by Dr. Eulalia Kamara. Margaux Jj states she is still having a problem with urinary frequency, apparently has to urinate hourly.  Advised

## 2018-06-11 ENCOUNTER — TELEPHONE (OUTPATIENT)
Dept: FAMILY MEDICINE CLINIC | Facility: CLINIC | Age: 83
End: 2018-06-11

## 2018-06-11 RX ORDER — OXYBUTYNIN CHLORIDE 10 MG/1
10 TABLET, EXTENDED RELEASE ORAL DAILY
Qty: 30 TABLET | Refills: 0 | COMMUNITY
Start: 2018-06-11 | End: 2018-09-21

## 2018-06-11 RX ORDER — ACETAMINOPHEN / DIPHENHYDRAMINE 25; 500 MG/1; MG/1
TABLET ORAL
Qty: 30 TABLET | Refills: 0 | COMMUNITY
Start: 2018-06-11 | End: 2019-02-22 | Stop reason: ALTCHOICE

## 2018-06-11 NOTE — TELEPHONE ENCOUNTER
Note from Don Milner R.N. Sends fax requesting order for Tylenol PM. Apparently she is awake frequently during the night due to urinary frequency. Urine has been tested, no infection. Dajuan Alegria attempted to contact Dr. Geri Kwon, he is out of the office.  She s

## 2018-06-18 ENCOUNTER — TELEPHONE (OUTPATIENT)
Dept: FAMILY MEDICINE CLINIC | Facility: CLINIC | Age: 83
End: 2018-06-18

## 2018-06-18 NOTE — TELEPHONE ENCOUNTER
Augustine Mullen at [facility] advised and read back order for clarity

## 2018-06-18 NOTE — TELEPHONE ENCOUNTER
Fax received from [facility]: Can we have an order to dc home on 6/20/2018 with home health and PT

## 2018-06-22 ENCOUNTER — TELEPHONE (OUTPATIENT)
Dept: FAMILY MEDICINE CLINIC | Facility: CLINIC | Age: 83
End: 2018-06-22

## 2018-06-22 DIAGNOSIS — M25.569 ACUTE KNEE PAIN, UNSPECIFIED LATERALITY: Primary | ICD-10-CM

## 2018-06-22 RX ORDER — GABAPENTIN 100 MG/1
200 CAPSULE ORAL 3 TIMES DAILY
Qty: 180 CAPSULE | Refills: 5 | Status: SHIPPED | OUTPATIENT
Start: 2018-06-22 | End: 2019-01-14

## 2018-06-22 NOTE — TELEPHONE ENCOUNTER
COMPLAINING OF PAIN, ASKING IF SHE CAN TAKE OTC ALEVE WITHOUT INTERACTIONS WITH OTHER MEDICATIONS?     ALSO ASKING FOR KNEE BRACE DUE TO KNEE PAIN IF WOULD BE COVERED BY MEDICARE    COMPLAINING OF NEUROPOTHY OF LEGS, CURRENTLY TAKING GABAPENTIN 100MG TID, A

## 2018-06-22 NOTE — TELEPHONE ENCOUNTER
OK for all of below. Can increase Gabapentin to 200mg TID if she can tolerate. V/o Dr. Evelyn Rosales advised and verbalizes understanding. Danielle Darby. Needs all sent to [facility].

## 2018-06-27 ENCOUNTER — TELEPHONE (OUTPATIENT)
Dept: FAMILY MEDICINE CLINIC | Facility: CLINIC | Age: 83
End: 2018-06-27

## 2018-06-27 NOTE — TELEPHONE ENCOUNTER
Serafin Pina advised and verbalized understand of the information provided  Future Appointments  Date Time Provider Danae Shalini   6/28/2018 9:00 AM Antonella Whalen MD [facility] [place]     Med was not d/c off med list

## 2018-06-27 NOTE — TELEPHONE ENCOUNTER
Pts. Physical therapist called re: her blood pressure. He will not be at her house much longer but the pt. Will be able to explain her concerns with her BP.

## 2018-06-27 NOTE — TELEPHONE ENCOUNTER
Pt states that when she moves around or does PT, her BP can go up to 160/80. Pt did not c/o any symptoms. Advised pt that she would need to be seen for the BP issues. Appointments offered but pt needs to call son for ride.  Will call back

## 2018-06-27 NOTE — TELEPHONE ENCOUNTER
Daughter in law called back to say that pt's was recently started on oxybutynin and this is when the high BP, dizziness and ankle swelling started. Advised that I was unaware of the high BP but not the other symptoms.  Daughter in law denies: severe HA but

## 2018-06-28 ENCOUNTER — OFFICE VISIT (OUTPATIENT)
Dept: FAMILY MEDICINE CLINIC | Facility: CLINIC | Age: 83
End: 2018-06-28

## 2018-06-28 VITALS
HEART RATE: 60 BPM | OXYGEN SATURATION: 97 % | DIASTOLIC BLOOD PRESSURE: 70 MMHG | SYSTOLIC BLOOD PRESSURE: 140 MMHG | TEMPERATURE: 98 F | BODY MASS INDEX: 34 KG/M2 | WEIGHT: 186 LBS

## 2018-06-28 DIAGNOSIS — R35.0 URINARY FREQUENCY: ICD-10-CM

## 2018-06-28 DIAGNOSIS — R53.83 FATIGUE, UNSPECIFIED TYPE: ICD-10-CM

## 2018-06-28 DIAGNOSIS — R68.2 DRY MOUTH: ICD-10-CM

## 2018-06-28 DIAGNOSIS — G44.89 OTHER HEADACHE SYNDROME: Primary | ICD-10-CM

## 2018-06-28 DIAGNOSIS — M16.12 PRIMARY OSTEOARTHRITIS OF LEFT HIP: ICD-10-CM

## 2018-06-28 DIAGNOSIS — R60.0 BILATERAL LEG EDEMA: ICD-10-CM

## 2018-06-28 DIAGNOSIS — R42 DIZZINESS: ICD-10-CM

## 2018-06-28 PROCEDURE — 99214 OFFICE O/P EST MOD 30 MIN: CPT | Performed by: FAMILY MEDICINE

## 2018-06-28 RX ORDER — HYDROCODONE BITARTRATE AND ACETAMINOPHEN 5; 325 MG/1; MG/1
1 TABLET ORAL 2 TIMES DAILY PRN
Qty: 60 TABLET | Refills: 0 | Status: SHIPPED | OUTPATIENT
Start: 2018-06-28 | End: 2018-08-20

## 2018-06-28 NOTE — TELEPHONE ENCOUNTER
[facility] printed and [facility] QUERIED AND VERIFIED TODAY

## 2018-06-28 NOTE — TELEPHONE ENCOUNTER
Last OV 6/28/2018  Last filled: 3/9/2018 #60 no RF    Call Aisha Flanagan (son) on cell when ready.  Son will

## 2018-06-30 NOTE — PATIENT INSTRUCTIONS
Oxybutynin extended-release tablets  Brand Name: Ditropan XL  What is this medicine? OXYBUTYNIN (ox i BYOO ti otilio) is used to treat overactive bladder. This medicine reduces the amount of bathroom visits. It may also help to control wetting accidents. · medicines for fungal infections, like fluconazole, itraconazole, ketoconazole or voriconazole  What if I miss a dose? If you miss a dose, take it as soon as you can. If it is almost time for your next dose, take only that dose.  Do not take double or ext This medicine may cause dry eyes and blurred vision. If you wear contact lenses, you may feel some discomfort. Lubricating drops may help. See your eyecare professional if the problem does not go away or is severe.   You may notice the shells of the tablets

## 2018-06-30 NOTE — PROGRESS NOTES
Brenden Ackerman is a 80year old female. Patient presents with:  Dizziness: started since last UTI. [facility] mostly in morning time. . unbalanced. . room 6  Swelling: swelling in the ankle on and off for a long time. . room 6    Subjective   HPI:   Patient her Diphenhydramine-APAP, sleep, (ACETAMINOPHEN PM EX ST)  MG Oral Tab Take 1-2 tablets q hs prn for sleep Disp: 30 tablet Rfl: 0   rOPINIRole HCl 1 MG Oral Tab Take 1 tablet (1 mg total) by mouth once daily.  Disp: 90 tablet Rfl: 1   Cholecalciferol (V Wt 186 lb   SpO2 97%   BMI 34.02 kg/m²  Body mass index is 34.02 kg/m².       GENERAL: well developed, well nourished,in no apparent distress  But she states uncomfortable    SKIN: no rashes,no suspicious lesions  No pallor jaundice  No icterus    HEENT:

## 2018-07-06 ENCOUNTER — TELEPHONE (OUTPATIENT)
Dept: FAMILY MEDICINE CLINIC | Facility: CLINIC | Age: 83
End: 2018-07-06

## 2018-07-06 NOTE — TELEPHONE ENCOUNTER
Fax request received from [address] [place] [address] requesting Face to Face Encounter. Office visit dated 6/28/18 faxed to [phone].

## 2018-07-09 ENCOUNTER — TELEPHONE (OUTPATIENT)
Dept: FAMILY MEDICINE CLINIC | Facility: CLINIC | Age: 83
End: 2018-07-09

## 2018-07-09 NOTE — TELEPHONE ENCOUNTER
Left detailed message on identified vm in regards to Dr. Vishal Carrero response.  Office number provided for questions

## 2018-07-09 NOTE — TELEPHONE ENCOUNTER
Pt has been cc of left leg and hip pain,  No relief from heat, ice, Tylenol or  Norco,  Pt had a fall Saturday,  ROM is within normal limits,  Not headaches,  She did not hit her head, Little bruising on her back by left hip.   Please Advise

## 2018-07-09 NOTE — TELEPHONE ENCOUNTER
Tricia Villalpando of Dr. Faria Court recommendations. Per March Kathy, pt has appointment on Wednesday, OK to wait. Per Shruti, pt has good ROM only bruising w/ pain.

## 2018-07-16 DIAGNOSIS — G25.81 RESTLESS LEG SYNDROME: ICD-10-CM

## 2018-07-16 RX ORDER — ROPINIROLE 1 MG/1
TABLET, FILM COATED ORAL
Qty: 90 TABLET | Refills: 0 | Status: SHIPPED | OUTPATIENT
Start: 2018-07-16 | End: 2018-09-21

## 2018-07-17 NOTE — PATIENT INSTRUCTIONS
Gabapentin capsules or tablets  What is this medicine? GABAPENTIN (GA ba pen tin) is used to control partial seizures in adults with epilepsy. It is also used to treat certain types of nerve pain. How should I use this medicine?   Take this medicine by · certain medicines for anxiety or sleep  · certain medicines for depression or psychotic disturbances  · homatropine; hydrocodone  · naproxen  · narcotic medicines (opiates) for pain  · phenothiazines like chlorpromazine, mesoridazine, prochlorperazine, t Visit your doctor or health care professional for regular checks on your progress. You may want to keep a record at home of how you feel your condition is responding to treatment.  You may want to share this information with your doctor or health care profe NOTE:This sheet is a summary. It may not cover all possible information. If you have questions about this medicine, talk to your doctor, pharmacist, or health care provider.  Copyright© 2016 [employer]

## 2018-08-10 ENCOUNTER — MED REC SCAN ONLY (OUTPATIENT)
Dept: FAMILY MEDICINE CLINIC | Facility: CLINIC | Age: 83
End: 2018-08-10

## 2018-08-20 DIAGNOSIS — M16.12 PRIMARY OSTEOARTHRITIS OF LEFT HIP: ICD-10-CM

## 2018-08-20 RX ORDER — HYDROCODONE BITARTRATE AND ACETAMINOPHEN 5; 325 MG/1; MG/1
1 TABLET ORAL 2 TIMES DAILY PRN
Qty: 60 TABLET | Refills: 0 | Status: SHIPPED | OUTPATIENT
Start: 2018-08-20 | End: 2018-09-21

## 2018-08-20 NOTE — TELEPHONE ENCOUNTER
[facility] QUERIED AND VERIFIED TODAY

## 2018-08-20 NOTE — TELEPHONE ENCOUNTER
Left detailed message on [email or web address] that script is ready for   1 script placed up front

## 2018-09-11 ENCOUNTER — MED REC SCAN ONLY (OUTPATIENT)
Dept: FAMILY MEDICINE CLINIC | Facility: CLINIC | Age: 83
End: 2018-09-11

## 2018-09-21 ENCOUNTER — OFFICE VISIT (OUTPATIENT)
Dept: FAMILY MEDICINE CLINIC | Facility: CLINIC | Age: 83
End: 2018-09-21
Payer: MEDICARE

## 2018-09-21 VITALS
OXYGEN SATURATION: 97 % | BODY MASS INDEX: 33 KG/M2 | TEMPERATURE: 99 F | HEART RATE: 66 BPM | DIASTOLIC BLOOD PRESSURE: 70 MMHG | SYSTOLIC BLOOD PRESSURE: 138 MMHG | WEIGHT: 183 LBS

## 2018-09-21 DIAGNOSIS — Z23 NEED FOR INFLUENZA VACCINATION: ICD-10-CM

## 2018-09-21 DIAGNOSIS — R35.0 FREQUENCY OF URINATION: ICD-10-CM

## 2018-09-21 DIAGNOSIS — G25.81 RESTLESS LEG SYNDROME: ICD-10-CM

## 2018-09-21 DIAGNOSIS — R35.0 URINARY FREQUENCY: ICD-10-CM

## 2018-09-21 DIAGNOSIS — M16.12 PRIMARY OSTEOARTHRITIS OF LEFT HIP: Primary | ICD-10-CM

## 2018-09-21 PROCEDURE — 90653 IIV ADJUVANT VACCINE IM: CPT | Performed by: FAMILY MEDICINE

## 2018-09-21 PROCEDURE — 99213 OFFICE O/P EST LOW 20 MIN: CPT | Performed by: FAMILY MEDICINE

## 2018-09-21 PROCEDURE — G0008 ADMIN INFLUENZA VIRUS VAC: HCPCS | Performed by: FAMILY MEDICINE

## 2018-09-21 RX ORDER — HYDROCODONE BITARTRATE AND ACETAMINOPHEN 5; 325 MG/1; MG/1
1 TABLET ORAL EVERY 8 HOURS PRN
Qty: 45 TABLET | Refills: 0 | Status: SHIPPED | OUTPATIENT
Start: 2018-09-21 | End: 2018-10-03

## 2018-09-21 RX ORDER — ROPINIROLE 1 MG/1
1 TABLET, FILM COATED ORAL NIGHTLY
Qty: 90 TABLET | Refills: 1 | Status: SHIPPED | OUTPATIENT
Start: 2018-09-21 | End: 2019-04-15

## 2018-09-23 NOTE — PROGRESS NOTES
Josette Manuel is a 80year old female. Patient presents with:  Medication Follow-Up: room 8  Leg Pain: left leg pain. . roon 8    Subjective   HPI:   Here for evaluation  She states she has a lot of pain on the knee   She had seen the ortho  And ha Use      Smoking status: Never Smoker      Smokeless tobacco: Never Used    Alcohol use: No      Alcohol/week: 0.0 oz    Drug use: No       BP Readings from Last 6 Encounters:  09/21/18 : 138/70  06/28/18 : 140/70  04/20/18 : 136/70  01/29/18 : 120/60  01/ better injection           Relevant Medications    HYDROcodone-acetaminophen 5-325 MG Oral Tab    Restless leg syndrome    Relevant Medications    rOPINIRole HCl 1 MG Oral Tab      Other Visit Diagnoses     Need for influenza vaccination        Frequency o

## 2018-09-23 NOTE — ASSESSMENT & PLAN NOTE
Refill and continue the hydrocodoone as there a not many other treatments  See ortho for possible better injection

## 2018-10-03 ENCOUNTER — TELEPHONE (OUTPATIENT)
Dept: FAMILY MEDICINE CLINIC | Facility: CLINIC | Age: 83
End: 2018-10-03

## 2018-10-03 DIAGNOSIS — M16.12 PRIMARY OSTEOARTHRITIS OF LEFT HIP: ICD-10-CM

## 2018-10-03 RX ORDER — HYDROCODONE BITARTRATE AND ACETAMINOPHEN 5; 325 MG/1; MG/1
1 TABLET ORAL EVERY 8 HOURS PRN
Qty: 45 TABLET | Refills: 0 | Status: SHIPPED | OUTPATIENT
Start: 2018-10-03 | End: 2018-10-25
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
## 2018-10-03 NOTE — TELEPHONE ENCOUNTER
[facility] QUERIED AND VERIFIED TODAY
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
## 2018-10-25 DIAGNOSIS — M16.12 PRIMARY OSTEOARTHRITIS OF LEFT HIP: ICD-10-CM

## 2018-10-25 NOTE — TELEPHONE ENCOUNTER
HYDROcodone-acetaminophen 5-325 MG    Patient or son Jose Andujar will  scipt- Friday afternoon or Saturday

## 2018-10-26 RX ORDER — HYDROCODONE BITARTRATE AND ACETAMINOPHEN 5; 325 MG/1; MG/1
1 TABLET ORAL EVERY 8 HOURS PRN
Qty: 90 TABLET | Refills: 0 | Status: SHIPPED | OUTPATIENT
Start: 2018-10-26 | End: 2018-12-19

## 2018-10-26 NOTE — TELEPHONE ENCOUNTER
[facility] QUERIED AND VERIFIED TODAY

## 2018-12-19 DIAGNOSIS — M16.12 PRIMARY OSTEOARTHRITIS OF LEFT HIP: ICD-10-CM

## 2018-12-19 RX ORDER — HYDROCODONE BITARTRATE AND ACETAMINOPHEN 5; 325 MG/1; MG/1
1 TABLET ORAL EVERY 8 HOURS PRN
Qty: 90 TABLET | Refills: 0 | Status: SHIPPED | OUTPATIENT
Start: 2018-12-19 | End: 2019-02-22

## 2018-12-19 NOTE — TELEPHONE ENCOUNTER
[facility] QUERIED AND VERIFIED TODAY

## 2019-01-07 ENCOUNTER — TELEPHONE (OUTPATIENT)
Dept: FAMILY MEDICINE CLINIC | Facility: CLINIC | Age: 84
End: 2019-01-07

## 2019-01-07 DIAGNOSIS — M16.12 PRIMARY OSTEOARTHRITIS OF LEFT HIP: ICD-10-CM

## 2019-01-07 DIAGNOSIS — Z87.81 HISTORY OF FEMUR FRACTURE: Primary | ICD-10-CM

## 2019-01-07 DIAGNOSIS — M15.9 PRIMARY OSTEOARTHRITIS INVOLVING MULTIPLE JOINTS: ICD-10-CM

## 2019-01-07 NOTE — TELEPHONE ENCOUNTER
Pt states the walker she currently has is giving her a hard time. She was wanting to know if she can get a new order for a walker? Pt also wants to know if Medicare will pay for the walker?  Pt aware I will send the note to Dr. Domi Shelton and see if he will wr

## 2019-01-07 NOTE — TELEPHONE ENCOUNTER
Pt aware I have the order for a new walker. The pt states she would like to pick out the walker. She is aware I will mail out the order to her and she can take it to which DME she would like to pick out a walker.  fady

## 2019-01-14 ENCOUNTER — TELEPHONE (OUTPATIENT)
Dept: FAMILY MEDICINE CLINIC | Facility: CLINIC | Age: 84
End: 2019-01-14

## 2019-01-14 RX ORDER — GABAPENTIN 100 MG/1
200 CAPSULE ORAL 3 TIMES DAILY
Qty: 180 CAPSULE | Refills: 0 | Status: SHIPPED | OUTPATIENT
Start: 2019-01-14 | End: 2019-02-13

## 2019-02-13 RX ORDER — GABAPENTIN 100 MG/1
200 CAPSULE ORAL 3 TIMES DAILY
Qty: 180 CAPSULE | Refills: 0 | Status: SHIPPED | OUTPATIENT
Start: 2019-02-13 | End: 2019-03-18

## 2019-02-22 ENCOUNTER — OFFICE VISIT (OUTPATIENT)
Dept: FAMILY MEDICINE CLINIC | Facility: CLINIC | Age: 84
End: 2019-02-22
Payer: MEDICARE

## 2019-02-22 VITALS
DIASTOLIC BLOOD PRESSURE: 64 MMHG | RESPIRATION RATE: 12 BRPM | TEMPERATURE: 98 F | HEIGHT: 63.25 IN | WEIGHT: 185 LBS | SYSTOLIC BLOOD PRESSURE: 128 MMHG | HEART RATE: 60 BPM | BODY MASS INDEX: 32.37 KG/M2

## 2019-02-22 DIAGNOSIS — M16.12 PRIMARY OSTEOARTHRITIS OF LEFT HIP: ICD-10-CM

## 2019-02-22 DIAGNOSIS — Z00.00 ENCOUNTER FOR MEDICARE ANNUAL WELLNESS EXAM: Primary | ICD-10-CM

## 2019-02-22 PROCEDURE — G0439 PPPS, SUBSEQ VISIT: HCPCS | Performed by: FAMILY MEDICINE

## 2019-02-22 RX ORDER — HYDROCODONE BITARTRATE AND ACETAMINOPHEN 5; 325 MG/1; MG/1
1 TABLET ORAL EVERY 8 HOURS PRN
Qty: 90 TABLET | Refills: 0 | Status: SHIPPED | OUTPATIENT
Start: 2019-02-22 | End: 2019-04-22

## 2019-02-22 NOTE — PROGRESS NOTES
HPI:   Saw Medina is a 80year old female who presents for a Medicare Subsequent Annual Wellness visit (Pt already had Initial Annual Wellness).       Her last annual assessment has been over 1 year: Annual Physical due on 01/29/2019         Fa 08/18/2017    CA 9.9 08/18/2017    ALB 3.3 (L) 08/18/2017    TSH 1.110 08/18/2017    CREATSERUM 1.10 06/06/2018    GLU 68 06/06/2018        CBC  (most recent labs)   Lab Results   Component Value Date    WBC 8.1 06/06/2018    HGB 11.2 06/06/2018     Weight as of this encounter: 185 lb.     Medicare Hearing Assessment  (Required for AWV/SWV)    Hearing Screening    Time taken:  2/22/2019 10:30 AM  Screening Method:  Whisper Test  Whisper Test Result:  Pass            Visual Acuity you maintain positive mental well-being?: Social Interaction;Puzzles; Visiting Friends;Games; Visiting Family      This section provided for quick review of chart, separate sheet to patient  [address] [place],[address] Internal Lab or Pro (Prevnar)  Covered Once after 65 10/04/2016 Please get once after your 65th birthday    Pneumococcal 23 (Pneumovax)  Covered Once after 65 01/29/2018 Please get once after your 65th birthday    Hepatitis B for Moderate/High Risk No vaccine history found Me

## 2019-02-22 NOTE — PATIENT INSTRUCTIONS
Lucinda Emmanuel's SCREENING SCHEDULE   Tests on this list are recommended by your physician but may not be covered, or covered at this frequency, by your insurer. Please check with your insurance carrier before scheduling to verify coverage.    PREV Sigmoidoscopy Screen  Covered every 5 years No results found for this or any previous visit. No flowsheet data found. Fecal Occult Blood   Covered Annually No results found for: FOB, OCCULTSTOOL No flowsheet data found.      Barium Enema-   uncomfortabl once after your 65th birthday    Pneumococcal 23 (Pneumovax)  Covered Once after 72 Orders placed or performed in visit on 01/29/18   • PNEUMOCOCCAL IMM (PNEUMOVAX)    Please get once after your 65th birthday    Hepatitis B for Moderate/High Risk       No

## 2019-03-08 ENCOUNTER — OFFICE VISIT (OUTPATIENT)
Dept: FAMILY MEDICINE CLINIC | Facility: CLINIC | Age: 84
End: 2019-03-08
Payer: MEDICARE

## 2019-03-08 VITALS
HEIGHT: 63.25 IN | TEMPERATURE: 97 F | SYSTOLIC BLOOD PRESSURE: 136 MMHG | RESPIRATION RATE: 12 BRPM | BODY MASS INDEX: 33.25 KG/M2 | HEART RATE: 56 BPM | DIASTOLIC BLOOD PRESSURE: 68 MMHG | WEIGHT: 190 LBS

## 2019-03-08 DIAGNOSIS — J01.90 ACUTE RHINOSINUSITIS: Primary | ICD-10-CM

## 2019-03-08 DIAGNOSIS — R05.9 COUGH: ICD-10-CM

## 2019-03-08 PROCEDURE — 99213 OFFICE O/P EST LOW 20 MIN: CPT | Performed by: FAMILY MEDICINE

## 2019-03-08 RX ORDER — CEFUROXIME AXETIL 250 MG/1
250 TABLET ORAL 2 TIMES DAILY
Qty: 20 TABLET | Refills: 0 | Status: SHIPPED | OUTPATIENT
Start: 2019-03-08 | End: 2019-06-18

## 2019-03-08 NOTE — PROGRESS NOTES
Patient presents with:  Cough: inrm. 6      HPI:   Ramses Horne is a 80year old female who presents for upper respiratory symptoms for  3  days.  Patient reports sore throat, congestion, dry cough, chest pain from coughing, OTC cold meds have not SYSTEMS:   GENERAL: feels well otherwise  SKIN: no rashes  EYES:denies blurred vision or double vision  HEENT: congested  LUNGS: denies shortness of breath with exertion  CARDIOVASCULAR: denies chest pain on exertion  GI: no nausea or abdominal pain  NEURO

## 2019-03-18 ENCOUNTER — TELEPHONE (OUTPATIENT)
Dept: FAMILY MEDICINE CLINIC | Facility: CLINIC | Age: 84
End: 2019-03-18

## 2019-03-18 RX ORDER — GABAPENTIN 100 MG/1
200 CAPSULE ORAL 3 TIMES DAILY
Qty: 180 CAPSULE | Refills: 0 | Status: SHIPPED | OUTPATIENT
Start: 2019-03-18 | End: 2019-04-15

## 2019-04-04 ENCOUNTER — MED REC SCAN ONLY (OUTPATIENT)
Dept: FAMILY MEDICINE CLINIC | Facility: CLINIC | Age: 84
End: 2019-04-04

## 2019-04-15 ENCOUNTER — TELEPHONE (OUTPATIENT)
Dept: FAMILY MEDICINE CLINIC | Facility: CLINIC | Age: 84
End: 2019-04-15

## 2019-04-15 DIAGNOSIS — G25.81 RESTLESS LEG SYNDROME: ICD-10-CM

## 2019-04-15 RX ORDER — ROPINIROLE 1 MG/1
1 TABLET, FILM COATED ORAL NIGHTLY
Qty: 90 TABLET | Refills: 0 | Status: SHIPPED | OUTPATIENT
Start: 2019-04-15 | End: 2019-07-15

## 2019-04-15 RX ORDER — GABAPENTIN 100 MG/1
200 CAPSULE ORAL 3 TIMES DAILY
Qty: 180 CAPSULE | Refills: 0 | Status: SHIPPED | OUTPATIENT
Start: 2019-04-15 | End: 2019-05-17

## 2019-04-22 DIAGNOSIS — M16.12 PRIMARY OSTEOARTHRITIS OF LEFT HIP: ICD-10-CM

## 2019-04-22 RX ORDER — HYDROCODONE BITARTRATE AND ACETAMINOPHEN 5; 325 MG/1; MG/1
1 TABLET ORAL EVERY 8 HOURS PRN
Qty: 90 TABLET | Refills: 0 | Status: SHIPPED | OUTPATIENT
Start: 2019-04-22 | End: 2019-06-17

## 2019-04-22 NOTE — TELEPHONE ENCOUNTER
[facility] QUERIED AND VERIFIED TODAY

## 2019-04-22 NOTE — TELEPHONE ENCOUNTER
HYDROcodone-acetaminophen 5-325 MG Oral Tab     Pt has a ride to  script tomorrow morning around 9:15am. Please call to confirm if this is ok.

## 2019-05-17 RX ORDER — GABAPENTIN 100 MG/1
200 CAPSULE ORAL 3 TIMES DAILY
Qty: 180 CAPSULE | Refills: 5 | Status: SHIPPED | OUTPATIENT
Start: 2019-05-17 | End: 2019-08-16

## 2019-05-17 NOTE — TELEPHONE ENCOUNTER
Gabapentin refill request.     Last office visit: 3/8/2019  Last refill: 4/15/2019, #180, 0 refills  Labs completed: 8/8/2018    No future appointments.

## 2019-06-17 DIAGNOSIS — M16.12 PRIMARY OSTEOARTHRITIS OF LEFT HIP: ICD-10-CM

## 2019-06-17 RX ORDER — HYDROCODONE BITARTRATE AND ACETAMINOPHEN 5; 325 MG/1; MG/1
1 TABLET ORAL EVERY 8 HOURS PRN
Qty: 90 TABLET | Refills: 0 | Status: SHIPPED | OUTPATIENT
Start: 2019-06-17 | End: 2019-08-16

## 2019-06-18 ENCOUNTER — OFFICE VISIT (OUTPATIENT)
Dept: FAMILY MEDICINE CLINIC | Facility: CLINIC | Age: 84
End: 2019-06-18
Payer: MEDICARE

## 2019-06-18 VITALS
SYSTOLIC BLOOD PRESSURE: 128 MMHG | WEIGHT: 195 LBS | HEART RATE: 72 BPM | HEIGHT: 63.25 IN | BODY MASS INDEX: 34.12 KG/M2 | DIASTOLIC BLOOD PRESSURE: 64 MMHG | RESPIRATION RATE: 20 BRPM | TEMPERATURE: 98 F

## 2019-06-18 DIAGNOSIS — M15.9 PRIMARY OSTEOARTHRITIS INVOLVING MULTIPLE JOINTS: ICD-10-CM

## 2019-06-18 DIAGNOSIS — Z87.81 HISTORY OF FEMUR FRACTURE: ICD-10-CM

## 2019-06-18 DIAGNOSIS — J01.90 ACUTE RHINOSINUSITIS: Primary | ICD-10-CM

## 2019-06-18 DIAGNOSIS — R05.9 COUGH: ICD-10-CM

## 2019-06-18 DIAGNOSIS — M16.12 PRIMARY OSTEOARTHRITIS OF LEFT HIP: ICD-10-CM

## 2019-06-18 PROCEDURE — 99213 OFFICE O/P EST LOW 20 MIN: CPT | Performed by: FAMILY MEDICINE

## 2019-06-18 RX ORDER — CEFUROXIME AXETIL 250 MG/1
250 TABLET ORAL 2 TIMES DAILY
Qty: 20 TABLET | Refills: 0 | Status: SHIPPED | OUTPATIENT
Start: 2019-06-18 | End: 2019-08-01

## 2019-06-18 NOTE — PROGRESS NOTES
Za Marte is a 80year old female.   Patient presents with:  Chest Congestion: .sore throat inrm 5      Chief Complaint Reviewed and Verified  Nursing Notes Reviewed and   Verified  Tobacco Reviewed  Allergies Reviewed  Medications Reviewed    P Restless leg syndrome 9/9/2179   • Systolic murmur 1/8/3805      Social History:  Social History    Tobacco Use      Smoking status: Never Smoker      Smokeless tobacco: Never Used    Alcohol use: No      Alcohol/week: 0.0 oz    Drug use: No       BP Readi Cefuroxime Axetil 250 MG Oral Tab    Cough        Relevant Medications    Cefuroxime Axetil 250 MG Oral Tab          Patient asked for gabapentin refills --it  appears that she has multiple refills available for her at the pharmacy already      Return if s

## 2019-07-10 ENCOUNTER — TELEPHONE (OUTPATIENT)
Dept: FAMILY MEDICINE CLINIC | Facility: CLINIC | Age: 84
End: 2019-07-10

## 2019-07-15 ENCOUNTER — TELEPHONE (OUTPATIENT)
Dept: FAMILY MEDICINE CLINIC | Facility: CLINIC | Age: 84
End: 2019-07-15

## 2019-07-15 DIAGNOSIS — G25.81 RESTLESS LEG SYNDROME: ICD-10-CM

## 2019-07-15 RX ORDER — ROPINIROLE 1 MG/1
1 TABLET, FILM COATED ORAL NIGHTLY
Qty: 90 TABLET | Refills: 0 | Status: SHIPPED | OUTPATIENT
Start: 2019-07-15 | End: 2019-09-16

## 2019-08-01 ENCOUNTER — TELEPHONE (OUTPATIENT)
Dept: FAMILY MEDICINE CLINIC | Facility: CLINIC | Age: 84
End: 2019-08-01

## 2019-08-01 DIAGNOSIS — J01.90 ACUTE RHINOSINUSITIS: ICD-10-CM

## 2019-08-01 DIAGNOSIS — R05.9 COUGH: ICD-10-CM

## 2019-08-01 RX ORDER — CEFUROXIME AXETIL 250 MG/1
250 TABLET ORAL 2 TIMES DAILY
Qty: 20 TABLET | Refills: 0 | Status: SHIPPED | OUTPATIENT
Start: 2019-08-01 | End: 2019-08-11

## 2019-08-01 NOTE — TELEPHONE ENCOUNTER
TALK TO NURSE ABOUT GETTING MEDICATION FOR BRONGHITIS AGAIN ASKING FOR MEDICATION WITHOUT APPOINTMENT

## 2019-08-01 NOTE — TELEPHONE ENCOUNTER
Pt states that she was seen in June and has the exact same symptoms. Pt is c/o dry cough, ST, fatigue. Denies SOB, fever, or recent exposure. Pt does not have a way to come to the office and was hoping Dr. Juwan Son would call in what he did last time.  Basim

## 2019-08-16 ENCOUNTER — OFFICE VISIT (OUTPATIENT)
Dept: FAMILY MEDICINE CLINIC | Facility: CLINIC | Age: 84
End: 2019-08-16
Payer: MEDICARE

## 2019-08-16 VITALS
RESPIRATION RATE: 12 BRPM | DIASTOLIC BLOOD PRESSURE: 76 MMHG | WEIGHT: 196 LBS | BODY MASS INDEX: 34 KG/M2 | SYSTOLIC BLOOD PRESSURE: 150 MMHG | TEMPERATURE: 98 F | HEART RATE: 60 BPM

## 2019-08-16 DIAGNOSIS — G25.81 RESTLESS LEG SYNDROME: ICD-10-CM

## 2019-08-16 DIAGNOSIS — N18.30 CKD (CHRONIC KIDNEY DISEASE) STAGE 3, GFR 30-59 ML/MIN (HCC): ICD-10-CM

## 2019-08-16 DIAGNOSIS — M16.12 PRIMARY OSTEOARTHRITIS OF LEFT HIP: ICD-10-CM

## 2019-08-16 DIAGNOSIS — R01.1 SYSTOLIC MURMUR: ICD-10-CM

## 2019-08-16 DIAGNOSIS — Z79.899 ENCOUNTER FOR LONG-TERM (CURRENT) USE OF MEDICATIONS: Primary | ICD-10-CM

## 2019-08-16 DIAGNOSIS — G60.9 IDIOPATHIC PERIPHERAL NEUROPATHY: ICD-10-CM

## 2019-08-16 DIAGNOSIS — M15.9 PRIMARY OSTEOARTHRITIS INVOLVING MULTIPLE JOINTS: ICD-10-CM

## 2019-08-16 PROCEDURE — 99213 OFFICE O/P EST LOW 20 MIN: CPT | Performed by: FAMILY MEDICINE

## 2019-08-16 RX ORDER — HYDROCODONE BITARTRATE AND ACETAMINOPHEN 5; 325 MG/1; MG/1
1 TABLET ORAL EVERY 8 HOURS PRN
Qty: 90 TABLET | Refills: 0 | Status: SHIPPED | OUTPATIENT
Start: 2019-08-16 | End: 2019-10-18

## 2019-08-16 RX ORDER — GABAPENTIN 100 MG/1
200 CAPSULE ORAL 3 TIMES DAILY
Qty: 180 CAPSULE | Refills: 5 | Status: SHIPPED | OUTPATIENT
Start: 2019-08-16 | End: 2019-09-16

## 2019-08-16 NOTE — PROGRESS NOTES
Milinda Gaucher is a 80year old female.   Patient presents with:  Medication Follow-Up: .inrm 6       Chief Complaint Reviewed and Verified  Nursing Notes Reviewed and   Verified  Tobacco Reviewed  Allergies Reviewed  Medications Reviewed    Problem 6/9/2015   • Peripheral neuropathy 6/9/2015   • Primary osteoarthritis of left hip 6/9/2015   • Restless leg syndrome 9/6/5415   • Systolic murmur 2/8/6053      Social History:  Social History    Tobacco Use      Smoking status: Never Smoker      Smokeless gabapentin 100 MG Oral Cap    Primary osteoarthritis of left hip    Relevant Medications    HYDROcodone-acetaminophen 5-325 MG Oral Tab    Restless leg syndrome    Systolic murmur    CKD (chronic kidney disease) stage 3, GFR 30-59 ml/min ([facility])    Osteoarth

## 2019-09-11 ENCOUNTER — OFFICE VISIT (OUTPATIENT)
Dept: FAMILY MEDICINE CLINIC | Facility: CLINIC | Age: 84
End: 2019-09-11
Payer: MEDICARE

## 2019-09-11 VITALS
DIASTOLIC BLOOD PRESSURE: 60 MMHG | SYSTOLIC BLOOD PRESSURE: 138 MMHG | HEIGHT: 63.25 IN | BODY MASS INDEX: 34.12 KG/M2 | WEIGHT: 195 LBS | HEART RATE: 92 BPM | TEMPERATURE: 99 F | RESPIRATION RATE: 12 BRPM

## 2019-09-11 DIAGNOSIS — R07.89 ATYPICAL CHEST PAIN: Primary | ICD-10-CM

## 2019-09-11 DIAGNOSIS — Z23 NEED FOR VACCINATION: ICD-10-CM

## 2019-09-11 DIAGNOSIS — R03.0 TRANSIENT ELEVATED BLOOD PRESSURE: ICD-10-CM

## 2019-09-11 PROCEDURE — 90662 IIV NO PRSV INCREASED AG IM: CPT | Performed by: FAMILY MEDICINE

## 2019-09-11 PROCEDURE — G0008 ADMIN INFLUENZA VIRUS VAC: HCPCS | Performed by: FAMILY MEDICINE

## 2019-09-11 PROCEDURE — 99213 OFFICE O/P EST LOW 20 MIN: CPT | Performed by: FAMILY MEDICINE

## 2019-09-11 RX ORDER — ISOSORBIDE MONONITRATE 30 MG/1
30 TABLET, EXTENDED RELEASE ORAL DAILY
Qty: 30 TABLET | Refills: 1 | Status: SHIPPED | OUTPATIENT
Start: 2019-09-11 | End: 2019-09-16

## 2019-09-11 RX ORDER — ISOSORBIDE MONONITRATE 30 MG/1
TABLET, EXTENDED RELEASE ORAL
Qty: 90 TABLET | Refills: 1 | OUTPATIENT
Start: 2019-09-11

## 2019-09-13 NOTE — PROGRESS NOTES
Yoko Garrison is a 80year old female. No chief complaint on file.       No Further Nursing Notes to Review  Tobacco Reviewed  Allergies Reviewed    Medications Reviewed  Problem List Reviewed  Medical History Reviewed    Surgical History Reviewed syndrome 3/9/2198   • Systolic murmur 8/7/4780      Social History:  Social History    Tobacco Use      Smoking status: Never Smoker      Smokeless tobacco: Never Used    Alcohol use: No      Alcohol/week: 0.0 standard drinks    Drug use: No       BP Readi Visit     None      Visit Diagnoses     Atypical chest pain    -  Primary    possible angina--elevated bp  will attempt treatment with nitrates--isosorbide    Transient elevated blood pressure        will attempt treatment with nitrates--isosorbide    Need

## 2019-09-16 ENCOUNTER — TELEPHONE (OUTPATIENT)
Dept: FAMILY MEDICINE CLINIC | Facility: CLINIC | Age: 84
End: 2019-09-16

## 2019-09-16 DIAGNOSIS — G60.9 IDIOPATHIC PERIPHERAL NEUROPATHY: ICD-10-CM

## 2019-09-16 DIAGNOSIS — G25.81 RESTLESS LEG SYNDROME: ICD-10-CM

## 2019-09-16 RX ORDER — ROPINIROLE 1 MG/1
1 TABLET, FILM COATED ORAL NIGHTLY
Qty: 90 TABLET | Refills: 0 | Status: SHIPPED | OUTPATIENT
Start: 2019-09-16 | End: 2019-10-07

## 2019-09-16 RX ORDER — GABAPENTIN 100 MG/1
200 CAPSULE ORAL 3 TIMES DAILY
Qty: 540 CAPSULE | Refills: 0 | Status: SHIPPED | OUTPATIENT
Start: 2019-09-16 | End: 2020-03-14

## 2019-09-16 NOTE — TELEPHONE ENCOUNTER
FYI: Pt states that she was prescribed isosorbide and took it for 3 days. Pt states that she gets very dizzy when she stands, feels very tired, and has had a headache. Pt states her BP was 130/82.  Pt advises that she is going to stop the medication and see

## 2019-09-24 ENCOUNTER — OFFICE VISIT (OUTPATIENT)
Dept: FAMILY MEDICINE CLINIC | Facility: CLINIC | Age: 84
End: 2019-09-24
Payer: MEDICARE

## 2019-09-24 VITALS
TEMPERATURE: 97 F | SYSTOLIC BLOOD PRESSURE: 138 MMHG | HEART RATE: 88 BPM | RESPIRATION RATE: 20 BRPM | DIASTOLIC BLOOD PRESSURE: 60 MMHG | HEIGHT: 63.25 IN | WEIGHT: 195 LBS | BODY MASS INDEX: 34.12 KG/M2

## 2019-09-24 DIAGNOSIS — J44.0 BRONCHITIS, CHRONIC OBSTRUCTIVE W ACUTE BRONCHITIS (HCC): Primary | ICD-10-CM

## 2019-09-24 PROCEDURE — 99213 OFFICE O/P EST LOW 20 MIN: CPT | Performed by: FAMILY MEDICINE

## 2019-09-24 RX ORDER — CEFUROXIME AXETIL 250 MG/1
250 TABLET ORAL 2 TIMES DAILY
Qty: 20 TABLET | Refills: 0 | Status: SHIPPED | OUTPATIENT
Start: 2019-09-24 | End: 2019-10-04

## 2019-09-24 NOTE — PROGRESS NOTES
Patient presents with:  Cough: .inrm 6    Chief Complaint Reviewed and Verified  Nursing Notes Reviewed and   Verified  Tobacco Reviewed  Allergies Reviewed  Medications Reviewed    Problem List Reviewed  Medical History Reviewed  Surgical History   Review • Systolic murmur 3/3/7951      History reviewed. No pertinent surgical history. History reviewed. No pertinent family history. Social History    Tobacco Use      Smoking status: Never Smoker      Smokeless tobacco: Never Used    Alcohol use:  No 250 MG Oral Tab 20 tablet 0     Sig: Take 1 tablet (250 mg total) by mouth 2 (two) times daily for 10 days. Imaging & Consults:  None    Return if symptoms worsen or fail to improve.     Alexis Man M.D., FAAFP

## 2019-09-24 NOTE — PATIENT INSTRUCTIONS
Cefuroxime tablets  Brand Names: Alti-Cefuroxime, Ceftin  What is this medicine? CEFUROXIME (se fyoor OX eem) is a cephalosporin antibiotic. It is used to treat certain kinds of bacterial infections.  It will not work for colds, flu, or other viral inf dose?  If you miss a dose, take it as soon as you can. If it is almost time for your next dose, take only that dose. Do not take double or extra doses. Where should I keep my medicine? Keep out of the reach of children.   Store at room temperature between

## 2019-10-07 ENCOUNTER — TELEPHONE (OUTPATIENT)
Dept: FAMILY MEDICINE CLINIC | Facility: CLINIC | Age: 84
End: 2019-10-07

## 2019-10-07 DIAGNOSIS — G25.81 RESTLESS LEG SYNDROME: ICD-10-CM

## 2019-10-07 RX ORDER — ROPINIROLE 1 MG/1
1 TABLET, FILM COATED ORAL NIGHTLY
Qty: 90 TABLET | Refills: 0 | Status: SHIPPED | OUTPATIENT
Start: 2019-10-07 | End: 2020-05-21

## 2019-10-18 DIAGNOSIS — M16.12 PRIMARY OSTEOARTHRITIS OF LEFT HIP: ICD-10-CM

## 2019-10-18 RX ORDER — HYDROCODONE BITARTRATE AND ACETAMINOPHEN 5; 325 MG/1; MG/1
1 TABLET ORAL EVERY 8 HOURS PRN
Qty: 90 TABLET | Refills: 0 | Status: SHIPPED | OUTPATIENT
Start: 2019-10-18 | End: 2020-02-14

## 2019-11-01 ENCOUNTER — OFFICE VISIT (OUTPATIENT)
Dept: FAMILY MEDICINE CLINIC | Facility: CLINIC | Age: 84
End: 2019-11-01
Payer: MEDICARE

## 2019-11-01 ENCOUNTER — TELEPHONE (OUTPATIENT)
Dept: FAMILY MEDICINE CLINIC | Facility: CLINIC | Age: 84
End: 2019-11-01

## 2019-11-01 VITALS
WEIGHT: 194 LBS | BODY MASS INDEX: 33.95 KG/M2 | SYSTOLIC BLOOD PRESSURE: 120 MMHG | DIASTOLIC BLOOD PRESSURE: 60 MMHG | RESPIRATION RATE: 12 BRPM | HEIGHT: 63.25 IN | HEART RATE: 76 BPM | TEMPERATURE: 98 F

## 2019-11-01 DIAGNOSIS — K21.9 GASTROESOPHAGEAL REFLUX DISEASE WITHOUT ESOPHAGITIS: ICD-10-CM

## 2019-11-01 DIAGNOSIS — R35.0 URINARY FREQUENCY: Primary | ICD-10-CM

## 2019-11-01 PROCEDURE — 87086 URINE CULTURE/COLONY COUNT: CPT | Performed by: FAMILY MEDICINE

## 2019-11-01 PROCEDURE — 99214 OFFICE O/P EST MOD 30 MIN: CPT | Performed by: FAMILY MEDICINE

## 2019-11-01 PROCEDURE — 87186 SC STD MICRODIL/AGAR DIL: CPT | Performed by: FAMILY MEDICINE

## 2019-11-01 PROCEDURE — 81003 URINALYSIS AUTO W/O SCOPE: CPT | Performed by: FAMILY MEDICINE

## 2019-11-01 PROCEDURE — 87077 CULTURE AEROBIC IDENTIFY: CPT | Performed by: FAMILY MEDICINE

## 2019-11-01 RX ORDER — SOLIFENACIN SUCCINATE 10 MG/1
10 TABLET, FILM COATED ORAL DAILY
Qty: 90 TABLET | Refills: 0 | Status: SHIPPED | OUTPATIENT
Start: 2019-11-01 | End: 2019-11-01

## 2019-11-01 RX ORDER — CIPROFLOXACIN 250 MG/1
250 TABLET, FILM COATED ORAL 2 TIMES DAILY
Qty: 20 TABLET | Refills: 0 | Status: SHIPPED | OUTPATIENT
Start: 2019-11-01 | End: 2019-11-04

## 2019-11-01 RX ORDER — PANTOPRAZOLE SODIUM 40 MG/1
40 TABLET, DELAYED RELEASE ORAL
Qty: 90 TABLET | Refills: 0 | Status: SHIPPED | OUTPATIENT
Start: 2019-11-01 | End: 2020-04-17

## 2019-11-01 NOTE — TELEPHONE ENCOUNTER
Spoke with Kedar Singh, and advised of these results and recommendations    Rx to local [facility]    ----- Message from Lexi Fonscea MD sent at 11/1/2019 12:04 PM CDT -----  Send for u culture  Let patient know it seems there is a uti  And we should start

## 2019-11-01 NOTE — PROGRESS NOTES
Received fax from [facility] stating Solifenacin was not covered by pt's plan. Dr. Gino Guardado ok'd switching pt to Myrbetriq 25mg daily #90 with 3 refills.

## 2019-11-04 RX ORDER — NITROFURANTOIN 25; 75 MG/1; MG/1
100 CAPSULE ORAL 2 TIMES DAILY
Qty: 14 CAPSULE | Refills: 0 | Status: SHIPPED | OUTPATIENT
Start: 2019-11-04 | End: 2020-01-08 | Stop reason: ALTCHOICE

## 2019-11-06 NOTE — PROGRESS NOTES
Milinda Gaucher is a 80year old female. Patient presents with:  Urinary:  states she can not empty her bladder . inrm 6  Leg Pain: lock up and to walk   Reflux: chest pain       Chief Complaint Reviewed and Verified  Nursing Notes Reviewed and   Adriano murmur 6/9/2015      Social History:  Social History    Tobacco Use      Smoking status: Never Smoker      Smokeless tobacco: Never Used    Alcohol use: No      Alcohol/week: 0.0 standard drinks    Drug use: No       BP Readings from Last 6 Encounters:  11 Medications    Pantoprazole Sodium 40 MG Oral Tab EC            Return if symptoms worsen or fail to improve.           Meds & Refills for this Visit:  Requested Prescriptions     Signed Prescriptions Disp Refills   • Pantoprazole Sodium 40 MG Oral Tab EC 9

## 2019-12-26 ENCOUNTER — TELEPHONE (OUTPATIENT)
Dept: FAMILY MEDICINE CLINIC | Facility: CLINIC | Age: 84
End: 2019-12-26

## 2019-12-26 RX ORDER — DIGOXIN 125 MCG
125 TABLET ORAL EVERY OTHER DAY
Qty: 45 TABLET | Refills: 0 | Status: SHIPPED | OUTPATIENT
Start: 2019-12-26 | End: 2020-01-08 | Stop reason: ALTCHOICE

## 2019-12-26 NOTE — TELEPHONE ENCOUNTER
Patient was admitted into [facility] for a UTI on 11/06/19 and was discharged to [facility] the first part of December. Patient has been home since 12/20/19. When she was in the hospital, Dr Mike Peters, the Cardiologist put her on some Heart Medication (Digoxin . 125 mg

## 2019-12-26 NOTE — TELEPHONE ENCOUNTER
Med noted in 11/15/2019 hospital admit  Copied from discharge med summary:    Hospital Course: 20-year-old female with paroxysmal A. fib was in a swing bed for rehab however patient had developed tachybradycardia arrhythmia.  Patient was seen by cardiologis

## 2020-01-06 ENCOUNTER — MED REC SCAN ONLY (OUTPATIENT)
Dept: FAMILY MEDICINE CLINIC | Facility: CLINIC | Age: 85
End: 2020-01-06

## 2020-01-07 ENCOUNTER — TELEPHONE (OUTPATIENT)
Dept: FAMILY MEDICINE CLINIC | Facility: CLINIC | Age: 85
End: 2020-01-07

## 2020-01-07 NOTE — TELEPHONE ENCOUNTER
Pt. Started home health 46-43-70. Pt. Is scheduled for HFU visit tomorrow and Karol Monte is asking for OV notes to be faxed to him after the visit  Fax:  [phone]. Please call Karol Monte if pt. Does not show up for visit tomorrow.   faxed OV notes fro

## 2020-01-08 ENCOUNTER — TELEPHONE (OUTPATIENT)
Dept: FAMILY MEDICINE CLINIC | Facility: CLINIC | Age: 85
End: 2020-01-08

## 2020-01-08 ENCOUNTER — OFFICE VISIT (OUTPATIENT)
Dept: FAMILY MEDICINE CLINIC | Facility: CLINIC | Age: 85
End: 2020-01-08
Payer: MEDICARE

## 2020-01-08 VITALS
TEMPERATURE: 99 F | SYSTOLIC BLOOD PRESSURE: 130 MMHG | RESPIRATION RATE: 12 BRPM | BODY MASS INDEX: 33.95 KG/M2 | WEIGHT: 194 LBS | DIASTOLIC BLOOD PRESSURE: 80 MMHG | HEIGHT: 63.25 IN | HEART RATE: 72 BPM

## 2020-01-08 DIAGNOSIS — N39.0 RECURRENT UTI: ICD-10-CM

## 2020-01-08 DIAGNOSIS — N18.30 CKD (CHRONIC KIDNEY DISEASE) STAGE 3, GFR 30-59 ML/MIN (HCC): ICD-10-CM

## 2020-01-08 DIAGNOSIS — M16.12 PRIMARY OSTEOARTHRITIS OF LEFT HIP: ICD-10-CM

## 2020-01-08 DIAGNOSIS — M15.9 PRIMARY OSTEOARTHRITIS INVOLVING MULTIPLE JOINTS: ICD-10-CM

## 2020-01-08 DIAGNOSIS — I48.0 INTERMITTENT ATRIAL FIBRILLATION (HCC): ICD-10-CM

## 2020-01-08 DIAGNOSIS — R35.0 URINARY FREQUENCY: ICD-10-CM

## 2020-01-08 DIAGNOSIS — R35.0 URINARY FREQUENCY: Primary | ICD-10-CM

## 2020-01-08 PROCEDURE — 81003 URINALYSIS AUTO W/O SCOPE: CPT | Performed by: FAMILY MEDICINE

## 2020-01-08 PROCEDURE — 99214 OFFICE O/P EST MOD 30 MIN: CPT | Performed by: FAMILY MEDICINE

## 2020-01-08 RX ORDER — DIGOXIN 125 MCG
125 TABLET ORAL
COMMUNITY
Start: 2019-12-01 | End: 2020-03-18

## 2020-01-08 RX ORDER — FLUTICASONE PROPIONATE 50 MCG
1 SPRAY, SUSPENSION (ML) NASAL
COMMUNITY
Start: 2019-11-16

## 2020-01-08 RX ORDER — POLYETHYLENE GLYCOL 3350 17 G/17G
17 POWDER, FOR SOLUTION ORAL
COMMUNITY
Start: 2019-11-27

## 2020-01-08 RX ORDER — ACETAMINOPHEN 500 MG
1000 TABLET ORAL
COMMUNITY
Start: 2019-11-26

## 2020-01-08 NOTE — PROGRESS NOTES
Evelyn Azar is a 80year old female. Patient presents with:  UTI: inrm.        Chief Complaint Reviewed and Verified  Nursing Notes Reviewed and   Verified  Tobacco Reviewed  Allergies Reviewed  Medications Reviewed    Problem List Reviewed  Medi Multiple Vitamin (MULTI-VITAMIN DAILY) Oral Tab, Take by mouth., Disp: , Rfl:   Pantoprazole Sodium 40 MG Oral Tab EC, Take 1 tablet (40 mg total) by mouth every morning before breakfast. (Patient not taking: Reported on 1/8/2020 ), Disp: 90 tablet, Rfl: ASSESSMENT AND PLAN:     Urinary frequency  (primary encounter diagnosis)  Primary osteoarthritis involving multiple joints  Ckd (chronic kidney disease) stage 3, gfr 30-59 ml/min ([facility])  Primary osteoarthritis of left hip  Recurrent uti  Intermittent atr

## 2020-01-10 LAB
APPEARANCE: CLEAR
BILIRUBIN: NEGATIVE
GLUCOSE (URINE DIPSTICK): NEGATIVE MG/DL
KETONES (URINE DIPSTICK): NEGATIVE MG/DL
MULTISTIX LOT#: NORMAL NUMERIC
NITRITE, URINE: NEGATIVE
OCCULT BLOOD: NEGATIVE
PH, URINE: 5.5 (ref 4.5–8)
PROTEIN (URINE DIPSTICK): NEGATIVE MG/DL
SPECIFIC GRAVITY: 1.02 (ref 1–1.03)
URINE-COLOR: YELLOW
UROBILINOGEN,SEMI-QN: 0.2 MG/DL (ref 0–1.9)

## 2020-01-10 PROCEDURE — 87086 URINE CULTURE/COLONY COUNT: CPT | Performed by: NURSE PRACTITIONER

## 2020-02-06 ENCOUNTER — MED REC SCAN ONLY (OUTPATIENT)
Dept: FAMILY MEDICINE CLINIC | Facility: CLINIC | Age: 85
End: 2020-02-06

## 2020-02-14 ENCOUNTER — TELEPHONE (OUTPATIENT)
Dept: FAMILY MEDICINE CLINIC | Facility: CLINIC | Age: 85
End: 2020-02-14

## 2020-02-14 DIAGNOSIS — M16.12 PRIMARY OSTEOARTHRITIS OF LEFT HIP: ICD-10-CM

## 2020-02-14 RX ORDER — HYDROCODONE BITARTRATE AND ACETAMINOPHEN 5; 325 MG/1; MG/1
1 TABLET ORAL EVERY 8 HOURS PRN
Qty: 90 TABLET | Refills: 0 | Status: SHIPPED | OUTPATIENT
Start: 2020-02-14 | End: 2020-04-30

## 2020-02-14 RX ORDER — HYDROCODONE BITARTRATE AND ACETAMINOPHEN 5; 325 MG/1; MG/1
1 TABLET ORAL EVERY 8 HOURS PRN
Qty: 90 TABLET | Refills: 0 | Status: CANCELLED | OUTPATIENT
Start: 2020-02-14

## 2020-02-14 NOTE — TELEPHONE ENCOUNTER
Last ordered: 3 months ago by Francisco Cisneros MD    LOV  1/8/2020    LAST LAB- 6/8/2018    LAST RX- 10/18/2019  #90    Next OV   Future Appointments   Date Time Provider Danae Loya   2/28/2020  9:45 AM Bryce Olguin MD [facility]         PROT

## 2020-02-28 ENCOUNTER — OFFICE VISIT (OUTPATIENT)
Dept: FAMILY MEDICINE CLINIC | Facility: CLINIC | Age: 85
End: 2020-02-28
Payer: MEDICARE

## 2020-02-28 ENCOUNTER — LAB ENCOUNTER (OUTPATIENT)
Dept: LAB | Age: 85
End: 2020-02-28
Attending: FAMILY MEDICINE
Payer: MEDICARE

## 2020-02-28 VITALS
SYSTOLIC BLOOD PRESSURE: 112 MMHG | DIASTOLIC BLOOD PRESSURE: 69 MMHG | TEMPERATURE: 98 F | RESPIRATION RATE: 20 BRPM | HEART RATE: 80 BPM

## 2020-02-28 DIAGNOSIS — M16.12 PRIMARY OSTEOARTHRITIS OF LEFT HIP: ICD-10-CM

## 2020-02-28 DIAGNOSIS — Z00.00 ENCOUNTER FOR ANNUAL HEALTH EXAMINATION: ICD-10-CM

## 2020-02-28 DIAGNOSIS — G25.81 RESTLESS LEG SYNDROME: ICD-10-CM

## 2020-02-28 DIAGNOSIS — N18.30 CKD (CHRONIC KIDNEY DISEASE) STAGE 3, GFR 30-59 ML/MIN (HCC): ICD-10-CM

## 2020-02-28 DIAGNOSIS — R01.1 SYSTOLIC MURMUR: ICD-10-CM

## 2020-02-28 DIAGNOSIS — R42 DIZZINESS: ICD-10-CM

## 2020-02-28 DIAGNOSIS — D50.8 OTHER IRON DEFICIENCY ANEMIA: ICD-10-CM

## 2020-02-28 DIAGNOSIS — G60.9 IDIOPATHIC PERIPHERAL NEUROPATHY: ICD-10-CM

## 2020-02-28 DIAGNOSIS — M15.9 PRIMARY OSTEOARTHRITIS INVOLVING MULTIPLE JOINTS: ICD-10-CM

## 2020-02-28 DIAGNOSIS — D50.8 OTHER IRON DEFICIENCY ANEMIA: Primary | ICD-10-CM

## 2020-02-28 LAB
ALBUMIN SERPL-MCNC: 3.1 G/DL (ref 3.4–5)
ALBUMIN/GLOB SERPL: 0.8 {RATIO} (ref 1–2)
ALP LIVER SERPL-CCNC: 85 U/L (ref 55–142)
ALT SERPL-CCNC: 19 U/L (ref 13–56)
ANION GAP SERPL CALC-SCNC: 4 MMOL/L (ref 0–18)
AST SERPL-CCNC: 21 U/L (ref 15–37)
BASOPHILS # BLD AUTO: 0.05 X10(3) UL (ref 0–0.2)
BASOPHILS NFR BLD AUTO: 0.5 %
BILIRUB SERPL-MCNC: 0.3 MG/DL (ref 0.1–2)
BUN BLD-MCNC: 22 MG/DL (ref 7–18)
BUN/CREAT SERPL: 16.1 (ref 10–20)
CALCIUM BLD-MCNC: 9.2 MG/DL (ref 8.5–10.1)
CHLORIDE SERPL-SCNC: 108 MMOL/L (ref 98–112)
CO2 SERPL-SCNC: 27 MMOL/L (ref 21–32)
CREAT BLD-MCNC: 1.37 MG/DL (ref 0.55–1.02)
DEPRECATED RDW RBC AUTO: 62 FL (ref 35.1–46.3)
EOSINOPHIL # BLD AUTO: 0.36 X10(3) UL (ref 0–0.7)
EOSINOPHIL NFR BLD AUTO: 3.7 %
ERYTHROCYTE [DISTWIDTH] IN BLOOD BY AUTOMATED COUNT: 16.7 % (ref 11–15)
GLOBULIN PLAS-MCNC: 3.9 G/DL (ref 2.8–4.4)
GLUCOSE BLD-MCNC: 82 MG/DL (ref 70–99)
HCT VFR BLD AUTO: 36.9 % (ref 35–48)
HGB BLD-MCNC: 10.9 G/DL (ref 12–16)
IMM GRANULOCYTES # BLD AUTO: 0.02 X10(3) UL (ref 0–1)
IMM GRANULOCYTES NFR BLD: 0.2 %
LYMPHOCYTES # BLD AUTO: 1.51 X10(3) UL (ref 1–4)
LYMPHOCYTES NFR BLD AUTO: 15.4 %
M PROTEIN MFR SERPL ELPH: 7 G/DL (ref 6.4–8.2)
MCH RBC QN AUTO: 29.6 PG (ref 26–34)
MCHC RBC AUTO-ENTMCNC: 29.5 G/DL (ref 31–37)
MCV RBC AUTO: 100.3 FL (ref 80–100)
MONOCYTES # BLD AUTO: 0.61 X10(3) UL (ref 0.1–1)
MONOCYTES NFR BLD AUTO: 6.2 %
NEUTROPHILS # BLD AUTO: 7.26 X10 (3) UL (ref 1.5–7.7)
NEUTROPHILS # BLD AUTO: 7.26 X10(3) UL (ref 1.5–7.7)
NEUTROPHILS NFR BLD AUTO: 74 %
OSMOLALITY SERPL CALC.SUM OF ELEC: 290 MOSM/KG (ref 275–295)
PLATELET # BLD AUTO: 240 10(3)UL (ref 150–450)
POTASSIUM SERPL-SCNC: 4.8 MMOL/L (ref 3.5–5.1)
RBC # BLD AUTO: 3.68 X10(6)UL (ref 3.8–5.3)
SODIUM SERPL-SCNC: 139 MMOL/L (ref 136–145)
WBC # BLD AUTO: 9.8 X10(3) UL (ref 4–11)

## 2020-02-28 PROCEDURE — 80053 COMPREHEN METABOLIC PANEL: CPT

## 2020-02-28 PROCEDURE — 85025 COMPLETE CBC W/AUTO DIFF WBC: CPT

## 2020-02-28 PROCEDURE — 36415 COLL VENOUS BLD VENIPUNCTURE: CPT

## 2020-02-28 PROCEDURE — G0439 PPPS, SUBSEQ VISIT: HCPCS | Performed by: FAMILY MEDICINE

## 2020-02-28 NOTE — PATIENT INSTRUCTIONS
Natasha Emmanuel's SCREENING SCHEDULE   Tests on this list are recommended by your physician but may not be covered, or covered at this frequency, by your insurer. Please check with your insurance carrier before scheduling to verify coverage.    PREV Sigmoidoscopy Screen  Covered every 5 years No results found for this or any previous visit. No flowsheet data found. Fecal Occult Blood   Covered Annually No results found for: FOB, OCCULTSTOOL No flowsheet data found.      Barium Enema-   uncomfortabl placed or performed in visit on 10/04/16   • PNEUMOCOCCAL VACC, 13 JORDAN IM    Please get once after your 65th birthday    Pneumococcal 23 (Pneumovax)  Covered Once after 65 Orders placed or performed in visit on 01/29/18   • PNEUMOCOCCAL IMM (PNEUMOVAX)

## 2020-02-28 NOTE — PROGRESS NOTES
HPI:   Marky Lee is a 80year old female who presents for a Medicare Subsequent Annual Wellness visit (Pt already had Initial Annual Wellness).     Feels fair  Mild neck spasm today  And mildly space-y feeling    Her last annual assessment has kg)  11/01/19 : 194 lb (88 kg)  09/24/19 : 195 lb (88.5 kg)     Last Cholesterol Labs:   No results found for: CHOLEST, HDL, LDL, TRIG       Last Chemistry Labs:   Lab Results   Component Value Date    AST 27 08/18/2017    ALT 20 08/18/2017    CA 9.9 08/18 alcohol or use drugs.      REVIEW OF SYSTEMS:        EXAM:   /69 (BP Location: Right arm, Patient Position: Sitting, Cuff Size: large)   Pulse 80   Temp 98.2 °F (36.8 °C) (Temporal)   Resp 20  Estimated body mass index is 34.09 kg/m² as calculated fro left hip    Restless leg syndrome    Systolic murmur         Diet assessment: good     PLAN:  The patient indicates understanding of these issues and agrees to the plan.   Problem List Items Addressed This Visit        Unprioritized    Iron deficiency anemi with current treatment plan. Return in about 6 months (around 8/28/2020), or if symptoms worsen or fail to improve, for medication review, blood pressure check.      Lino Glass MD, 2/28/2020     General Health     In the past six months, have you preventive care reminders to display for this patient. Update Health Maintenance if applicable    Chlamydia  Annually if high risk No results found for: CHLAMYDIA No flowsheet data found.     Screening Mammogram      Mammogram Annually to 76, then as discus [88350]

## 2020-02-29 DIAGNOSIS — D50.8 OTHER IRON DEFICIENCY ANEMIA: ICD-10-CM

## 2020-02-29 DIAGNOSIS — N18.30 CKD (CHRONIC KIDNEY DISEASE) STAGE 3, GFR 30-59 ML/MIN (HCC): ICD-10-CM

## 2020-02-29 DIAGNOSIS — R42 DIZZINESS: Primary | ICD-10-CM

## 2020-03-12 ENCOUNTER — MED REC SCAN ONLY (OUTPATIENT)
Dept: FAMILY MEDICINE CLINIC | Facility: CLINIC | Age: 85
End: 2020-03-12

## 2020-03-18 RX ORDER — DIGOXIN 125 MCG
TABLET ORAL
Qty: 45 TABLET | Refills: 0 | Status: SHIPPED | OUTPATIENT
Start: 2020-03-18 | End: 2020-06-01

## 2020-03-18 NOTE — TELEPHONE ENCOUNTER
LOV  2/28/2020    LAST LAB  2/28/2020    LAST RX  12/01/2019      Next OV  No future appointments.     PROTOCOL  none

## 2020-04-14 ENCOUNTER — MED REC SCAN ONLY (OUTPATIENT)
Dept: FAMILY MEDICINE CLINIC | Facility: CLINIC | Age: 85
End: 2020-04-14

## 2020-04-17 DIAGNOSIS — K21.9 GASTROESOPHAGEAL REFLUX DISEASE WITHOUT ESOPHAGITIS: ICD-10-CM

## 2020-04-17 RX ORDER — PANTOPRAZOLE SODIUM 40 MG/1
TABLET, DELAYED RELEASE ORAL
Qty: 90 TABLET | Refills: 0 | Status: SHIPPED | OUTPATIENT
Start: 2020-04-17

## 2020-04-30 DIAGNOSIS — G60.9 IDIOPATHIC PERIPHERAL NEUROPATHY: ICD-10-CM

## 2020-04-30 DIAGNOSIS — M16.12 PRIMARY OSTEOARTHRITIS OF LEFT HIP: ICD-10-CM

## 2020-04-30 RX ORDER — GABAPENTIN 100 MG/1
100 CAPSULE ORAL 3 TIMES DAILY
Qty: 90 CAPSULE | Refills: 2 | Status: SHIPPED | OUTPATIENT
Start: 2020-04-30 | End: 2020-08-14

## 2020-04-30 RX ORDER — HYDROCODONE BITARTRATE AND ACETAMINOPHEN 5; 325 MG/1; MG/1
1 TABLET ORAL EVERY 8 HOURS PRN
Qty: 90 TABLET | Refills: 0 | Status: SHIPPED | OUTPATIENT
Start: 2020-04-30 | End: 2020-08-04

## 2020-04-30 NOTE — TELEPHONE ENCOUNTER
Electronically sent  [place] PRESCRIPTION DATA BASE QUERIED AND VERIFIED TODAY      Yes   The azo is over the counter

## 2020-05-21 DIAGNOSIS — G25.81 RESTLESS LEG SYNDROME: ICD-10-CM

## 2020-05-21 RX ORDER — ROPINIROLE 1 MG/1
TABLET, FILM COATED ORAL
Qty: 90 TABLET | Refills: 0 | Status: SHIPPED | OUTPATIENT
Start: 2020-05-21 | End: 2020-08-14

## 2020-05-21 NOTE — TELEPHONE ENCOUNTER
LOV:  2/28/2020     LAB:    2/28/2020     LRX:   rOPINIRole HCl 1 MG Oral Tab 90 tablet 0 refill 10/7/2019       NOV:   No future appointments.       PROTOCOL:    ROPINIROLE 1MG TABLETS          Will file in chart as: ROPINIROLE HCL 1 MG Oral Tab         Si

## 2020-06-01 ENCOUNTER — TELEPHONE (OUTPATIENT)
Dept: FAMILY MEDICINE CLINIC | Facility: CLINIC | Age: 85
End: 2020-06-01

## 2020-06-01 RX ORDER — DIGOXIN 125 MCG
500 TABLET ORAL EVERY OTHER DAY
Qty: 45 TABLET | Refills: 0 | Status: SHIPPED | OUTPATIENT
Start: 2020-06-01 | End: 2020-06-01 | Stop reason: CLARIF

## 2020-06-01 RX ORDER — DIGOXIN 125 MCG
500 TABLET ORAL EVERY OTHER DAY
Qty: 45 TABLET | Refills: 0 | Status: SHIPPED | OUTPATIENT
Start: 2020-06-01 | End: 2020-06-03

## 2020-06-01 NOTE — TELEPHONE ENCOUNTER
rec'd a fax from [facility] re: digoxin script sent today for 0.125mg tabs with directions of 4 tabs QOD. It states plan does not cover this medication-----plan limitationss exceeded.     **Faxed back that these directions are NOT CORRECT----pt takes 1 po Q

## 2020-06-03 RX ORDER — DIGOXIN 125 MCG
TABLET ORAL
Qty: 45 TABLET | Refills: 0 | Status: SHIPPED | OUTPATIENT
Start: 2020-06-03 | End: 2020-08-29

## 2020-06-08 ENCOUNTER — TELEPHONE (OUTPATIENT)
Dept: FAMILY MEDICINE CLINIC | Facility: CLINIC | Age: 85
End: 2020-06-08

## 2020-06-08 DIAGNOSIS — M16.12 PRIMARY OSTEOARTHRITIS OF LEFT HIP: Primary | ICD-10-CM

## 2020-06-08 DIAGNOSIS — R42 DIZZINESS: ICD-10-CM

## 2020-06-08 DIAGNOSIS — M15.9 PRIMARY OSTEOARTHRITIS INVOLVING MULTIPLE JOINTS: ICD-10-CM

## 2020-06-08 DIAGNOSIS — G60.9 IDIOPATHIC PERIPHERAL NEUROPATHY: ICD-10-CM

## 2020-06-08 NOTE — TELEPHONE ENCOUNTER
See below. Do you know if this was just an aide that came in and helped? Or did she have actual home health?   It appears that she is fairly new to you

## 2020-06-09 NOTE — TELEPHONE ENCOUNTER
[facility] stated pt stopped HH due to COVID in March    Pt hasn't been seen by Dr Evangelista Ortega since Feb 2020 and the [facility] will need a new order for nursing care and Dr Evangelista Ortega will need to get pt to at least do a Video visit as face to face once they get out

## 2020-06-09 NOTE — TELEPHONE ENCOUNTER
tis was [facility]    Maybe they can reassess    She likely is still homebound and qualifies

## 2020-06-10 ENCOUNTER — TELEPHONE (OUTPATIENT)
Dept: FAMILY MEDICINE CLINIC | Facility: CLINIC | Age: 85
End: 2020-06-10

## 2020-06-10 NOTE — TELEPHONE ENCOUNTER
Pt. Calling today asking about her home health for helping with showers. Yuval Aquino said she has been working on this and at this time pt. Doesn't need appt.

## 2020-06-11 ENCOUNTER — TELEPHONE (OUTPATIENT)
Dept: FAMILY MEDICINE CLINIC | Facility: CLINIC | Age: 85
End: 2020-06-11

## 2020-06-11 NOTE — TELEPHONE ENCOUNTER
Last appt was too long ago. PC to pt---advised that she needs to be seen in the office in the next few weeks.   She will c/b to schedule

## 2020-06-11 NOTE — TELEPHONE ENCOUNTER
We can send last office visit note we have    If not fresh enough  We can do a new f2f visit in the office

## 2020-06-11 NOTE — TELEPHONE ENCOUNTER
Faxed over order, and face sheet    Per intake RN, they may be able to have [facility] with camera phone do a Face to Face once they determine that she qualified for [facility]

## 2020-06-11 NOTE — TELEPHONE ENCOUNTER
Received an order for [facility]. Her HH was stopped due to COVID 19. They are going to need some additional information to re-start [facility] services. They need most recent OV Notes and face sheet faxed to fax# [phone].

## 2020-06-11 NOTE — TELEPHONE ENCOUNTER
[facility] is asking for a recent ofc note. Do you need her to come in? Or a phone OR video visit?     Then we can fax that note to them after completed

## 2020-06-11 NOTE — TELEPHONE ENCOUNTER
Order for New Renetta printed and faxed to [id number] [facility] @ [phone]

## 2020-06-17 ENCOUNTER — LAB ENCOUNTER (OUTPATIENT)
Dept: LAB | Age: 85
End: 2020-06-17
Attending: FAMILY MEDICINE
Payer: MEDICARE

## 2020-06-17 ENCOUNTER — OFFICE VISIT (OUTPATIENT)
Dept: FAMILY MEDICINE CLINIC | Facility: CLINIC | Age: 85
End: 2020-06-17
Payer: MEDICARE

## 2020-06-17 VITALS
WEIGHT: 173 LBS | RESPIRATION RATE: 12 BRPM | DIASTOLIC BLOOD PRESSURE: 80 MMHG | HEART RATE: 80 BPM | HEIGHT: 63.25 IN | TEMPERATURE: 98 F | BODY MASS INDEX: 30.27 KG/M2 | OXYGEN SATURATION: 95 % | SYSTOLIC BLOOD PRESSURE: 120 MMHG

## 2020-06-17 DIAGNOSIS — G60.9 IDIOPATHIC PERIPHERAL NEUROPATHY: ICD-10-CM

## 2020-06-17 DIAGNOSIS — R01.1 SYSTOLIC MURMUR: ICD-10-CM

## 2020-06-17 DIAGNOSIS — N18.30 CKD (CHRONIC KIDNEY DISEASE) STAGE 3, GFR 30-59 ML/MIN (HCC): ICD-10-CM

## 2020-06-17 DIAGNOSIS — N18.30 CKD (CHRONIC KIDNEY DISEASE) STAGE 3, GFR 30-59 ML/MIN (HCC): Primary | ICD-10-CM

## 2020-06-17 DIAGNOSIS — M16.12 PRIMARY OSTEOARTHRITIS OF LEFT HIP: ICD-10-CM

## 2020-06-17 DIAGNOSIS — R42 DIZZINESS: ICD-10-CM

## 2020-06-17 DIAGNOSIS — M15.9 PRIMARY OSTEOARTHRITIS INVOLVING MULTIPLE JOINTS: ICD-10-CM

## 2020-06-17 DIAGNOSIS — D50.8 OTHER IRON DEFICIENCY ANEMIA: ICD-10-CM

## 2020-06-17 PROCEDURE — 36415 COLL VENOUS BLD VENIPUNCTURE: CPT

## 2020-06-17 PROCEDURE — 80048 BASIC METABOLIC PNL TOTAL CA: CPT

## 2020-06-17 PROCEDURE — 85025 COMPLETE CBC W/AUTO DIFF WBC: CPT

## 2020-06-17 PROCEDURE — 99214 OFFICE O/P EST MOD 30 MIN: CPT | Performed by: FAMILY MEDICINE

## 2020-06-17 NOTE — PROGRESS NOTES
Za Marte is a 80year old female. Patient presents with:  Medication Follow-Up: inrm.  6      Chief Complaint Reviewed and Verified  Nursing Notes Reviewed and   Verified  Tobacco Reviewed  Allergies Reviewed  Medications Reviewed    Problem L STAGE 3    >30                                      STAGE 4             ALLERGIES:    Sulfa Antibiotics       FATIGUE, OTHER (SEE COMMENTS)    Comment:Anger and agitation.   Statins                 OTHER (SEE COMMENTS)      digoxin 0.125 MG Oral Tab, Karlee Reyna lb (78.5 kg)  01/08/20 : 194 lb (88 kg)  11/01/19 : 194 lb (88 kg)  09/24/19 : 195 lb (88.5 kg)  09/11/19 : 195 lb (88.5 kg)  08/16/19 : 196 lb (88.9 kg)      REVIEW OF SYSTEMS:   GENERAL HEALTH: feels well no complaints  SKIN: denies any unusual skin meredith condition, noted historically, continues present status           CKD (chronic kidney disease) stage 3, GFR 30-59 ml/min ([facility]) - Primary    Overview     Lab Results   Component Value Date    GFR 47 06/06/2018    GFR 44 (L) 08/18/2017    GFR 42 (L) 06/06/20

## 2020-06-22 ENCOUNTER — MED REC SCAN ONLY (OUTPATIENT)
Dept: FAMILY MEDICINE CLINIC | Facility: CLINIC | Age: 85
End: 2020-06-22

## 2020-07-07 ENCOUNTER — MED REC SCAN ONLY (OUTPATIENT)
Dept: FAMILY MEDICINE CLINIC | Facility: CLINIC | Age: 85
End: 2020-07-07

## 2020-07-09 ENCOUNTER — TELEPHONE (OUTPATIENT)
Dept: FAMILY MEDICINE CLINIC | Facility: CLINIC | Age: 85
End: 2020-07-09

## 2020-07-09 DIAGNOSIS — R39.15 URGENCY OF URINATION: Primary | ICD-10-CM

## 2020-07-09 PROCEDURE — 81003 URINALYSIS AUTO W/O SCOPE: CPT | Performed by: FAMILY MEDICINE

## 2020-07-09 NOTE — TELEPHONE ENCOUNTER
Spoke with patient who states she has been having constipation issues for several days. She also has the urge to urinate, but very little comes out. Nothing available until Monday with any provider.  Patient scheduled for Monday at 10:00am. Patient instruct

## 2020-07-09 NOTE — TELEPHONE ENCOUNTER
NOT SURE IF SHE HAS UTI, NOT SURE IF SHE NEEDS TO BE SEEN, OFFERED TO MAKE APPT, PT JUST WANTS NURSE TO CALL HER

## 2020-07-10 ENCOUNTER — TELEPHONE (OUTPATIENT)
Dept: FAMILY MEDICINE CLINIC | Facility: CLINIC | Age: 85
End: 2020-07-10

## 2020-07-10 ENCOUNTER — NURSE ONLY (OUTPATIENT)
Dept: FAMILY MEDICINE CLINIC | Facility: CLINIC | Age: 85
End: 2020-07-10
Payer: MEDICARE

## 2020-07-10 LAB
APPEARANCE: YELLOW
BILIRUBIN: NEGATIVE
GLUCOSE (URINE DIPSTICK): NEGATIVE MG/DL
KETONES (URINE DIPSTICK): NEGATIVE MG/DL
MULTISTIX LOT#: 1044 NUMERIC
NITRITE, URINE: NEGATIVE
PH, URINE: 6 (ref 4.5–8)
PROTEIN (URINE DIPSTICK): NEGATIVE MG/DL
SPECIFIC GRAVITY: 1.02 (ref 1–1.03)
UROBILINOGEN,SEMI-QN: 0.2 MG/DL (ref 0–1.9)

## 2020-07-10 PROCEDURE — 87086 URINE CULTURE/COLONY COUNT: CPT | Performed by: FAMILY MEDICINE

## 2020-07-10 RX ORDER — NITROFURANTOIN 25; 75 MG/1; MG/1
100 CAPSULE ORAL 2 TIMES DAILY
Qty: 20 CAPSULE | Refills: 0 | Status: SHIPPED | OUTPATIENT
Start: 2020-07-10 | End: 2020-07-20

## 2020-07-24 RX ORDER — CELECOXIB 100 MG/1
CAPSULE ORAL
Qty: 180 CAPSULE | Refills: 0 | Status: SHIPPED | OUTPATIENT
Start: 2020-07-24

## 2020-07-29 ENCOUNTER — MED REC SCAN ONLY (OUTPATIENT)
Dept: FAMILY MEDICINE CLINIC | Facility: CLINIC | Age: 85
End: 2020-07-29

## 2020-07-31 ENCOUNTER — OFFICE VISIT (OUTPATIENT)
Dept: FAMILY MEDICINE CLINIC | Facility: CLINIC | Age: 85
End: 2020-07-31
Payer: MEDICARE

## 2020-07-31 VITALS
RESPIRATION RATE: 20 BRPM | DIASTOLIC BLOOD PRESSURE: 76 MMHG | HEART RATE: 70 BPM | BODY MASS INDEX: 30 KG/M2 | WEIGHT: 172.25 LBS | SYSTOLIC BLOOD PRESSURE: 116 MMHG | TEMPERATURE: 97 F

## 2020-07-31 DIAGNOSIS — I49.5 BRADY-TACHY SYNDROME (HCC): ICD-10-CM

## 2020-07-31 DIAGNOSIS — N18.30 CKD (CHRONIC KIDNEY DISEASE) STAGE 3, GFR 30-59 ML/MIN (HCC): ICD-10-CM

## 2020-07-31 DIAGNOSIS — G25.81 RESTLESS LEG SYNDROME: ICD-10-CM

## 2020-07-31 DIAGNOSIS — G60.9 IDIOPATHIC PERIPHERAL NEUROPATHY: ICD-10-CM

## 2020-07-31 DIAGNOSIS — Z87.81 HISTORY OF FEMUR FRACTURE: ICD-10-CM

## 2020-07-31 DIAGNOSIS — Z13.0 SCREENING, ANEMIA, DEFICIENCY, IRON: ICD-10-CM

## 2020-07-31 DIAGNOSIS — R77.8 ELEVATED TROPONIN I LEVEL: ICD-10-CM

## 2020-07-31 DIAGNOSIS — N39.0 SEPSIS SECONDARY TO UTI (HCC): ICD-10-CM

## 2020-07-31 DIAGNOSIS — M15.9 PRIMARY OSTEOARTHRITIS INVOLVING MULTIPLE JOINTS: ICD-10-CM

## 2020-07-31 DIAGNOSIS — M16.12 PRIMARY OSTEOARTHRITIS OF LEFT HIP: ICD-10-CM

## 2020-07-31 DIAGNOSIS — A41.9 SEPSIS SECONDARY TO UTI (HCC): ICD-10-CM

## 2020-07-31 DIAGNOSIS — N39.0 FREQUENT UTI: Primary | ICD-10-CM

## 2020-07-31 DIAGNOSIS — J90 PLEURAL EFFUSION: ICD-10-CM

## 2020-07-31 PROCEDURE — 1111F DSCHRG MED/CURRENT MED MERGE: CPT | Performed by: FAMILY MEDICINE

## 2020-07-31 PROCEDURE — 99215 OFFICE O/P EST HI 40 MIN: CPT | Performed by: FAMILY MEDICINE

## 2020-07-31 RX ORDER — ASPIRIN 81 MG/1
81 TABLET ORAL DAILY
COMMUNITY

## 2020-07-31 RX ORDER — MAGNESIUM OXIDE TAB 400 MG (241.3 MG ELEMENTAL MG) 400 (241.3 MG) MG
1 TAB ORAL DAILY
COMMUNITY
Start: 2020-07-23 | End: 2020-09-17 | Stop reason: ALTCHOICE

## 2020-07-31 RX ORDER — FUROSEMIDE 20 MG/1
20 TABLET ORAL EVERY OTHER DAY
COMMUNITY
Start: 2020-07-23 | End: 2020-09-16

## 2020-07-31 NOTE — ASSESSMENT & PLAN NOTE
"ALLERGY & IMMUNOLOGY CLINIC - FOLLOW UP     HISTORY OF PRESENT ILLNESS     Patient ID: Jayme Kraus is a 19 y.o. female    CC: follow up eczema     HPI: 18 yo girl with a long history of atopic dermatitis that is augmented by stress and known allergens (alejandra grass), presents for follow up for atopic dermatitis and new pruritic rash on arms.      Today she presents with her mom.  She reports she is doing well since she was last seen in 2018.  She recently completed her first year of college and has adjusted very well with no flare ups and good maintenance of eczema.  Her eczema flares up when under stress, exposed to known allergens, and during the summer time.  Today she is mainly concerned with trying to smooth out rough patches from past eczema flares, and she is curious as to whether post-inflammatory hyperpigmentation will resolve.  She has had some minor flares within the last year on the left front shoulder and the dorsum of both hands.  Both flares have resolved with use of topical hydrocortisone ("Cortisone 10").  She also uses eucerin and aquaphor liberally.     She is currently taking grastek and zyrtec daily and applying topical hydrocortisone.  She was unable to continue with bleach baths due to the lack of bathtubs her first year of college, but she is planning on restarting now that she is back at home and will have a bathtub this upcoming year at school.  She has not been using wet wraps recently.     She has a history of intermittent asthma for which she has not had to use her inhaler in 3-4 years.  She requests a refill of her inhaler for future use just if needed; she suspects hers has .  She is infrequently experiencing some rhinorrhea, congestion, and sneezing in the mornings for which she uses sudafed in addition to her daily zyrtec.  She wants to get back on her nasal spray and requests a prescription refill.      REVIEW OF SYSTEMS     CONST: no F/C/NS, no unintentional weight " Discontinue Celebrex, and resume her hydrocodone at the limited small and judicious dose that she currently takes "changes  NEURO: no H/A, no weakness, no paresthesias  EYES: no discharge, no pruritus, no erythema  EARS: no hearing loss, no sensation of fullness  NOSE: no congestion, no rhinorrhea, no itching, no sneezing  PULM: no SOB, no wheezing, no cough  CV: no CP, no palpitations, no leg swelling  GI: no dysphagia, no heartburn, no pain, no N/V/D  MSK: no joint pain, no muscle pain  DERM: resolving pruritic papular rash on both forearms, chronic eczema     MEDICAL HISTORY     MedHx: eczema, asthma   SurgHx:  No significant surgical history                     SocHx: Living a dorm at college.  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  She completed the year with a 4.0 GPA. She is not around any pets or smoking at school.   FamHx: paternal grandmother  of colon cancer. Paternal grandmother has CHF. Maternal grandmother has CHF, and stroke.                Father has eczema.              Allergies: multiple drug allergies  Medications: MAR reviewed     H/o Asthma: see above  H/o Eczema: (+) see above  Oral Allergy: n/a  Food Allergy:  Has lip swelling when she has mussels. Avoids some foods because she thinks they flare her skin.   Venom Allergy: denies  Latex Allergy: denies  Other Allergies: denies  Env/Occ: denies any environmental or occupational exposures     PHYSICAL EXAM     VS: Pulse 82   Ht 5' 7" (1.702 m)   Wt 68.5 kg (151 lb 0.2 oz)   LMP 2019 (Approximate)   SpO2 98%   BMI 23.65 kg/m²   GENERAL: alert, NAD, well-appearing, cooperative  EYES: PERRL, EOMI, no conjunctival injection, no discharge, no infraorbital shiners  ORAL: MMM, no ulcers, no thrush, no cobblestoning  NECK: supple, trachea midline, no thyromegaly, no LAD  LUNGS: CTAB, no w/r/c, no increased WOB  HEART: RRR, normal S1/S2, no m/g/r  EXTREMITIES: +2 distal pulses, no c/c/e  DERM: resolving papular rash on both forearms, xerotic and hyperpigmented patches in flexural elbows, hyperpigmented patch with " papules with irregular borders on left shoulder, cluster of papules on the dorsum of left hand, hyperpigmented patches on lateral and posterior neck, slight hyperpigmentation on dorsal side of both calves   NEURO: normal gait, no facial asymmetry       ALLERGEN TESTING     Immunocaps: 7/2016 - significant positivity to grasses (alejandra and bermuda were the ones we measured). [place] of outdoor allergens with much lower values of specific IgE, but  is clearly grasses.      ASSESSMENT & PLAN     Jayme Kraus is a 19 y.o. female with     1. ongoing atopic dermatitis currently treated with hydrocortisone, aquaphor, and eucerin  2. Allergic rhinitis with grass as driving allergen  3. Intermittent asthma  4. History of antibiotic allergy    Plan:   Continue daily grastek x 3-5 years total (started 8/2017)  Continue zyrtec for nasal symptoms. Can restart INCS, flonase Rx sent today.   Refill albuterol just in case.   Continue hydrocortisone, frequent use of emollients, bleach baths.   Can attempt ammonium lactate cream to smooth papules and rough patches, but I recommend testing this on one specific area prior to using it diffusely.     We discussed a penicillin challenge in the future.      Follow up: 1 year, sooner if needed    Brenna Moran MD  Allergy/Immunology

## 2020-07-31 NOTE — PROGRESS NOTES
Gaila Severs is a 80year old female. Patient presents with:   Follow - Up: Was admitted to [facility] on 7/15-7/23 for UTI  Medication Eval      Chief Complaint Reviewed and Verified  Nursing Notes Reviewed and   Verified  Tobacco Reviewed  Cleatrice Plate that there is not much she can do except using bathroom frequency but also specifically drinking more fluid. She asked her much and we agreed that she should try and drink anywhere from 32 to 40 ounces of water or liquid daily.   This has been a source of on 7/31/2020 ), Disp: 45 tablet, Rfl: 0  HYDROcodone-acetaminophen 5-325 MG Oral Tab, Take 1 tablet by mouth every 8 (eight) hours as needed for Pain.  (Patient not taking: Reported on 7/31/2020 ), Disp: 90 tablet, Rfl: 0  Polyethylene Glycol 3350 Oral Powd 30-59 ml/min (hcc)  Restless leg syndrome  Idiopathic peripheral neuropathy  History of femur fracture left  Primary osteoarthritis of left hip  Alvarez-tachy syndrome (hcc)  Elevated troponin i level  Pleural effusion  Screening, anemia, deficiency, iron advised to continue.     Elevated troponin I level        Thought related to demand ischemia, resolved at this time no myocardial infarction continue metoprolol    Pleural effusion        Found in hospital during sepsis when demand ischemia happening, seems

## 2020-07-31 NOTE — ASSESSMENT & PLAN NOTE
Discontinue Celebrex, and resume her hydrocodone at the limited small and judicious dose that she currently takes

## 2020-08-04 DIAGNOSIS — M16.12 PRIMARY OSTEOARTHRITIS OF LEFT HIP: ICD-10-CM

## 2020-08-04 RX ORDER — HYDROCODONE BITARTRATE AND ACETAMINOPHEN 5; 325 MG/1; MG/1
1 TABLET ORAL EVERY 8 HOURS PRN
Qty: 90 TABLET | Refills: 0 | Status: SHIPPED | OUTPATIENT
Start: 2020-08-04 | End: 2020-11-30

## 2020-08-04 NOTE — TELEPHONE ENCOUNTER
LOV 7/31/20    LAST LAB 6/17/20    LAST RX 4/30/20    Next OV No future appointments.       PROTOCOL  none

## 2020-08-05 ENCOUNTER — TELEPHONE (OUTPATIENT)
Dept: FAMILY MEDICINE CLINIC | Facility: CLINIC | Age: 85
End: 2020-08-05

## 2020-08-12 ENCOUNTER — MED REC SCAN ONLY (OUTPATIENT)
Dept: FAMILY MEDICINE CLINIC | Facility: CLINIC | Age: 85
End: 2020-08-12

## 2020-08-13 DIAGNOSIS — G25.81 RESTLESS LEG SYNDROME: ICD-10-CM

## 2020-08-13 DIAGNOSIS — G60.9 IDIOPATHIC PERIPHERAL NEUROPATHY: ICD-10-CM

## 2020-08-14 RX ORDER — GABAPENTIN 100 MG/1
CAPSULE ORAL
Qty: 90 CAPSULE | Refills: 2 | Status: SHIPPED | OUTPATIENT
Start: 2020-08-14

## 2020-08-14 RX ORDER — ROPINIROLE 1 MG/1
TABLET, FILM COATED ORAL
Qty: 90 TABLET | Refills: 0 | Status: SHIPPED | OUTPATIENT
Start: 2020-08-14

## 2020-08-17 ENCOUNTER — TELEPHONE (OUTPATIENT)
Dept: FAMILY MEDICINE CLINIC | Facility: CLINIC | Age: 85
End: 2020-08-17

## 2020-08-17 RX ORDER — CIPROFLOXACIN 250 MG/1
250 TABLET, FILM COATED ORAL 2 TIMES DAILY
Qty: 14 TABLET | Refills: 0 | Status: SHIPPED | OUTPATIENT
Start: 2020-08-17 | End: 2020-08-24

## 2020-08-17 NOTE — TELEPHONE ENCOUNTER
Patient reports some burning with urination. Apparently the patient is very \"intune\" to these sx. Advised okay to do UA with reflex to C&S.

## 2020-08-17 NOTE — TELEPHONE ENCOUNTER
Patient was admitted into the hospital in July 2020. She came in to see Dr Rene Flores for her Hospital follow up on 07/31/2020. While I the hospital she was prescribed metoprolol Tartrate 25 MG Oral Tab.  She has finished this medication that was prescribed to

## 2020-08-19 ENCOUNTER — TELEPHONE (OUTPATIENT)
Dept: FAMILY MEDICINE CLINIC | Facility: CLINIC | Age: 85
End: 2020-08-19

## 2020-08-19 NOTE — TELEPHONE ENCOUNTER
Advised sonEna Quarkimberly culture showed very little growth but was clinically suspicious based on sx. Finish abx. Verbalized understanding.

## 2020-08-24 ENCOUNTER — TELEPHONE (OUTPATIENT)
Dept: FAMILY MEDICINE CLINIC | Facility: CLINIC | Age: 85
End: 2020-08-24

## 2020-08-24 NOTE — TELEPHONE ENCOUNTER
She is still being seen by the [facility], Frank. Frank will see her on Wednesday. Son is concerned that the visiting nurse visits are coming to an end.  Advised that Frank will assess her needs and either request discharge or for the service to vinicius

## 2020-08-24 NOTE — TELEPHONE ENCOUNTER
Patients son states that the patient finished her ABX for the UTI this morning. Wants to know if he can bring in another urine sample to make sure that the UTI is gone. Son states that she is not having any sxs but she didn't have any before either.  [place]

## 2020-08-29 RX ORDER — DIGOXIN 125 MCG
TABLET ORAL
Qty: 45 TABLET | Refills: 0 | Status: SHIPPED | OUTPATIENT
Start: 2020-08-29 | End: 2020-09-17 | Stop reason: ALTCHOICE

## 2020-08-31 ENCOUNTER — TELEPHONE (OUTPATIENT)
Dept: FAMILY MEDICINE CLINIC | Facility: CLINIC | Age: 85
End: 2020-08-31

## 2020-08-31 RX ORDER — NITROFURANTOIN 25; 75 MG/1; MG/1
100 CAPSULE ORAL 2 TIMES DAILY
Qty: 20 CAPSULE | Refills: 0 | Status: SHIPPED | OUTPATIENT
Start: 2020-08-31 | End: 2020-09-10

## 2020-09-01 ENCOUNTER — TELEPHONE (OUTPATIENT)
Dept: FAMILY MEDICINE CLINIC | Facility: CLINIC | Age: 85
End: 2020-09-01

## 2020-09-01 NOTE — TELEPHONE ENCOUNTER
Marbella Corley that we did receive the results, advised the patient to start macrobid 100 bid for 10 days.

## 2020-09-09 ENCOUNTER — TELEPHONE (OUTPATIENT)
Dept: FAMILY MEDICINE CLINIC | Facility: CLINIC | Age: 85
End: 2020-09-09

## 2020-09-09 NOTE — TELEPHONE ENCOUNTER
Trying to arrange discharge, family looking for caregiver for 24hr/day. They think they have that set up . Asking for an aggressive approach to help prevent recurrence.  She has never seen a urologist. Since she is still in the hospital can they have Dr. Perez Began

## 2020-09-09 NOTE — TELEPHONE ENCOUNTER
ADMITTED TO [facility] OVER A WEEK AGO NOW WITH ANOTHER UTI. FAMILY HAS QUESTIONS ABOUT HOW TO PREVENT THEM FROM HAPPENING FREQUENTLY.    HOSPITAL IS TELLING FAMILY SHE IS A FALL RISK AND NEEDS 24 HOUR CARE.       WANTS TO SPEAK TO NURSE OR DR Falcon See

## 2020-09-09 NOTE — TELEPHONE ENCOUNTER
Spoke with the hospitalist @ [facility], she reports Natasha's case has been discussed with Dr. Tha Vaughn. He has ordered flomax. And prophylactic keflex for her. He also ordered CT abdomen and post residual urine. He will see her as an OP.  Son advised, the hospital

## 2020-09-14 ENCOUNTER — TELEPHONE (OUTPATIENT)
Dept: FAMILY MEDICINE CLINIC | Facility: CLINIC | Age: 85
End: 2020-09-14

## 2020-09-14 ENCOUNTER — MED REC SCAN ONLY (OUTPATIENT)
Dept: FAMILY MEDICINE CLINIC | Facility: CLINIC | Age: 85
End: 2020-09-14

## 2020-09-14 NOTE — TELEPHONE ENCOUNTER
Patient was discharged home on Friday 09/11/2020. HH SN seen her yesterday and wants to know if Dr Elizabet Lombardo will still sign HH orders for SN, PT, OT and [facility] Aide?

## 2020-09-14 NOTE — TELEPHONE ENCOUNTER
La Nena Snyder advised Dr. Shree Estrada is out of the office today, requested she send the orders, he will address upon return to the office. She reports [place] usually takes the \"bus\" to her appointments.  [place] has an OV scheduled for tomorrow, I left a message on so

## 2020-09-16 RX ORDER — FUROSEMIDE 20 MG/1
TABLET ORAL
Qty: 45 TABLET | Refills: 0 | Status: SHIPPED | OUTPATIENT
Start: 2020-09-16 | End: 2020-09-17 | Stop reason: ALTCHOICE

## 2020-09-16 NOTE — TELEPHONE ENCOUNTER
Last Office Visit: 7/31/20  Last Refill: 7/23/20, historical entry      This med was not on her discharge medication list from recent hospitalization

## 2020-09-17 ENCOUNTER — OFFICE VISIT (OUTPATIENT)
Dept: FAMILY MEDICINE CLINIC | Facility: CLINIC | Age: 85
End: 2020-09-17
Payer: MEDICARE

## 2020-09-17 VITALS
HEIGHT: 63.25 IN | HEART RATE: 82 BPM | OXYGEN SATURATION: 97 % | WEIGHT: 174 LBS | RESPIRATION RATE: 12 BRPM | DIASTOLIC BLOOD PRESSURE: 70 MMHG | SYSTOLIC BLOOD PRESSURE: 118 MMHG | TEMPERATURE: 97 F | BODY MASS INDEX: 30.45 KG/M2

## 2020-09-17 DIAGNOSIS — Z23 NEED FOR VACCINATION: ICD-10-CM

## 2020-09-17 DIAGNOSIS — A41.9 SEPSIS SECONDARY TO UTI (HCC): ICD-10-CM

## 2020-09-17 DIAGNOSIS — N39.0 SEPSIS SECONDARY TO UTI (HCC): ICD-10-CM
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
## 2020-09-17 DIAGNOSIS — S20.212A RIB CONTUSION, LEFT, INITIAL ENCOUNTER: ICD-10-CM

## 2020-09-17 DIAGNOSIS — N39.0 FREQUENT UTI: Primary | ICD-10-CM
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
## 2020-09-17 LAB
APPEARANCE: CLEAR
MULTISTIX LOT#: 1044 NUMERIC
PH, URINE: 6 (ref 4.5–8)
SPECIFIC GRAVITY: 1.01 (ref 1–1.03)
URINE-COLOR: YELLOW
UROBILINOGEN,SEMI-QN: 0.2 MG/DL (ref 0–1.9)

## 2020-09-17 PROCEDURE — 99214 OFFICE O/P EST MOD 30 MIN: CPT | Performed by: FAMILY MEDICINE

## 2020-09-17 PROCEDURE — 81003 URINALYSIS AUTO W/O SCOPE: CPT | Performed by: FAMILY MEDICINE

## 2020-09-17 PROCEDURE — 90662 IIV NO PRSV INCREASED AG IM: CPT | Performed by: FAMILY MEDICINE

## 2020-09-17 PROCEDURE — 87086 URINE CULTURE/COLONY COUNT: CPT | Performed by: FAMILY MEDICINE

## 2020-09-17 PROCEDURE — 1111F DSCHRG MED/CURRENT MED MERGE: CPT | Performed by: FAMILY MEDICINE

## 2020-09-17 PROCEDURE — G0008 ADMIN INFLUENZA VIRUS VAC: HCPCS | Performed by: FAMILY MEDICINE

## 2020-09-17 RX ORDER — MULTIVIT WITH CALCIUM,IRON,MIN 18MG-0.4MG
TABLET ORAL
COMMUNITY
End: 2021-05-05

## 2020-09-17 RX ORDER — TAMSULOSIN HYDROCHLORIDE 0.4 MG/1
0.4 CAPSULE ORAL NIGHTLY
COMMUNITY
Start: 2020-09-11 | End: 2020-09-22

## 2020-09-17 RX ORDER — CEPHALEXIN 250 MG/1
250 CAPSULE ORAL DAILY
COMMUNITY
Start: 2020-09-11 | End: 2021-02-24

## 2020-09-17 NOTE — PROGRESS NOTES
Nancy Whipple is a 80year old female. Patient presents with:  Fall: left side -inrm.  6  ER F/U: from [facility]      Chief Complaint Reviewed and Verified  Nursing Notes Reviewed and   Verified  Tobacco Reviewed  Allergies Reviewed  Medications Reviewe Disp: , Rfl:   Multiple Vitamins-Minerals (QC WOMENS DAILY MULTIVITAMIN) Oral Tab, Take by mouth., Disp: , Rfl:   GABAPENTIN 100 MG Oral Cap, TAKE 1 CAPSULE(100 MG) BY MOUTH THREE TIMES DAILY, Disp: 90 capsule, Rfl: 2  ROPINIROLE HCL 1 MG Oral Tab, TAKE 1 : 195 lb (88.5 kg)      REVIEW OF SYSTEMS:   GENERAL HEALTH: feels better mild metabolically, however see HPI regarding recent fall  SKIN: denies any unusual skin lesions or rashes  RESPIRATORY: denies shortness of breath with exertion  CARDIOVASCULAR: den FLU VACC HIGH DOSE PRSV FREE (Completed)          Return if symptoms worsen or fail to improve.       Meds & Refills for this Visit:  Requested Prescriptions      No prescriptions requested or ordered in this encounter       Bonnie Monk M.D., FAAFP

## 2020-09-17 NOTE — PROGRESS NOTES
aware in office visit  Sent for urine culture given history of frequent UTIs and recent hospitalization for urosepsis await culture and sensitivity report no antibiotics added but she is on long-term cephalexin

## 2020-09-22 ENCOUNTER — TELEPHONE (OUTPATIENT)
Dept: FAMILY MEDICINE CLINIC | Facility: CLINIC | Age: 85
End: 2020-09-22

## 2020-09-22 RX ORDER — TAMSULOSIN HYDROCHLORIDE 0.4 MG/1
0.4 CAPSULE ORAL NIGHTLY
Qty: 30 CAPSULE | Refills: 0 | COMMUNITY
Start: 2020-09-22 | End: 2021-05-05

## 2020-09-22 NOTE — TELEPHONE ENCOUNTER
Pt wants to know if there is something in her medicine that would make her go to the bathroom, she is running to the bathroom every half hour.

## 2020-09-22 NOTE — TELEPHONE ENCOUNTER
Would the flomax cause this frequency? Tushar Carrera thinks this is causing the frequency. She had called but the # I had to call back was [facility]. Bandar Hernandez called back, they did not know of the problem.  Tushar Carrera is moving into [employer] in [place], hopefully t

## 2020-09-24 ENCOUNTER — TELEPHONE (OUTPATIENT)
Dept: FAMILY MEDICINE CLINIC | Facility: CLINIC | Age: 85
End: 2020-09-24

## 2020-09-24 DIAGNOSIS — Z20.822 ENCOUNTER FOR SCREENING LABORATORY TESTING FOR COVID-19 VIRUS: Primary | ICD-10-CM

## 2020-09-24 DIAGNOSIS — Z20.822 ENCOUNTER FOR SCREENING LABORATORY TESTING FOR COVID-19 VIRUS IN ASYMPTOMATIC PATIENT: Primary | ICD-10-CM

## 2020-09-24 NOTE — TELEPHONE ENCOUNTER
Yes, I [facility] website may be a place to find. Alternatively, I can order a COVID asymptomatic test through the system here, but I cannot order a rapid test, and they will not give a rapid test because of shortages.     The PCR test that is ordered however i

## 2020-09-24 NOTE — TELEPHONE ENCOUNTER
NEEDS TO GET A RAPID COVID TEST BEFORE BEING ADMITTED TO ASSISTED LIVING, SHE IS NOT HAVING ANY SYMPTOMS, WHERE CAN SHE GO TO GET TESTED?

## 2020-09-25 LAB — AMB EXT COVID-19 RESULT: NOT DETECTED

## 2020-09-28 ENCOUNTER — TELEPHONE (OUTPATIENT)
Dept: FAMILY MEDICINE CLINIC | Facility: CLINIC | Age: 85
End: 2020-09-28

## 2020-09-28 RX ORDER — ASPIRIN 81 MG
100 TABLET, DELAYED RELEASE (ENTERIC COATED) ORAL DAILY
Refills: 0 | COMMUNITY
Start: 2020-09-28

## 2020-09-28 NOTE — TELEPHONE ENCOUNTER
Covid test done pre admission to [facility]. She is to be admitted to the facility this week.

## 2020-09-28 NOTE — TELEPHONE ENCOUNTER
Spoke with Maria Victoria Torre this am, they have been notified that the covid test was negative. He's hoping to be able to move her in by Wednesday.

## 2020-10-01 ENCOUNTER — TELEPHONE (OUTPATIENT)
Dept: FAMILY MEDICINE CLINIC | Facility: CLINIC | Age: 85
End: 2020-10-01

## 2020-10-01 NOTE — TELEPHONE ENCOUNTER
See below---    I don't see either OTC med on med list    Medlist has Flomax directions as 1 po qd, but in the comment line it says \"change to QOD for the time being---9/22/20\"

## 2020-10-01 NOTE — TELEPHONE ENCOUNTER
Spoke with pt's sone to ask directions of OTC meds. He states Cranberry tabs are 1 po qd and AREDS (eye vitamins) are 1 po BID. Note printed and faxed to MICHELLE SHELTON [facility] @ [phone].

## 2020-10-01 NOTE — TELEPHONE ENCOUNTER
ANA SAID THAT SHE MOVED INTO [facility] YESTERDAY AND THAT THEY NEED A AN ORDER FROM THE DR Marta Keys THAT SHE CAN TAKE OVER THE COUNTER MEDS AND ONE IS THE AREDS (EYE VITAMINS) AND ONE IS THE CRANBERRY RX CAPS ND ALSO SHE NOW TAKES THE FLOMAX 1 EVERY

## 2020-10-13 ENCOUNTER — TELEPHONE (OUTPATIENT)
Dept: FAMILY MEDICINE CLINIC | Facility: CLINIC | Age: 85
End: 2020-10-13

## 2020-10-13 DIAGNOSIS — M16.12 PRIMARY OSTEOARTHRITIS OF LEFT HIP: ICD-10-CM

## 2020-10-13 DIAGNOSIS — R60.0 LOWER LEG EDEMA: Primary | ICD-10-CM

## 2020-10-15 ENCOUNTER — TELEPHONE (OUTPATIENT)
Dept: FAMILY MEDICINE CLINIC | Facility: CLINIC | Age: 85
End: 2020-10-15

## 2020-10-15 DIAGNOSIS — M16.12 PRIMARY OSTEOARTHRITIS OF LEFT HIP: ICD-10-CM

## 2020-10-15 RX ORDER — HYDROCODONE BITARTRATE AND ACETAMINOPHEN 5; 325 MG/1; MG/1
0.5 TABLET ORAL EVERY 8 HOURS PRN
Qty: 45 TABLET | Refills: 0 | Status: SHIPPED | OUTPATIENT
Start: 2020-10-15 | End: 2020-10-15

## 2020-10-15 RX ORDER — CHOLECALCIFEROL (VITAMIN D3) 125 MCG
1 CAPSULE ORAL DAILY
Qty: 90 TABLET | Refills: 1 | Status: SHIPPED | OUTPATIENT
Start: 2020-10-15 | End: 2020-11-14

## 2020-10-15 RX ORDER — FUROSEMIDE 20 MG/1
20 TABLET ORAL DAILY
Qty: 90 TABLET | Refills: 0 | Status: SHIPPED | OUTPATIENT
Start: 2020-10-15 | End: 2021-01-13

## 2020-10-15 NOTE — TELEPHONE ENCOUNTER
Reviewed with Dr. Natalia Krishnan, and he states start Furosemide 20 mg Qam and weekly weights. Also add Hydrocodone-Acetaminophen 5-325 oral tab, 0.5 tablets by mouth every 8 hours as needed for pain. Scripts sent in. Patient also needs an order for Vitamin D3.  D

## 2020-10-15 NOTE — TELEPHONE ENCOUNTER
Spoke with Bran Veliz, and she needs a new script sent in to pharmacy for the scheduled Norco. BMP order faxed to [facility]. Also, patient's weight upon admission was 178 lbs, she is now up to 192 lbs.

## 2020-10-15 NOTE — TELEPHONE ENCOUNTER
Renetta Aguilera from [facility] in [place] called and needs to speak with a nurse regarding some orders that she received for this patient.

## 2020-10-15 NOTE — TELEPHONE ENCOUNTER
Luis Eduardo Lopez called back and states patient's son was giving patient her [place] at [address], 2pm, and Michelle Martinez is wondering if she can get the [address] scheduled for 8,2, and 8 or if not scheduled, get the order changed to Q 6 hrs prn?   Also, do you want patient to  Be

## 2020-10-16 RX ORDER — HYDROCODONE BITARTRATE AND ACETAMINOPHEN 5; 325 MG/1; MG/1
0.5 TABLET ORAL EVERY 6 HOURS SCHEDULED
Qty: 90 TABLET | Refills: 0 | Status: SHIPPED | OUTPATIENT
Start: 2020-10-16 | End: 2020-11-15 | Stop reason: WASHOUT

## 2020-10-16 RX ORDER — HYDROCODONE BITARTRATE AND ACETAMINOPHEN 5; 325 MG/1; MG/1
0.5 TABLET ORAL EVERY 6 HOURS SCHEDULED
Qty: 120 TABLET | Refills: 0 | Status: SHIPPED | OUTPATIENT
Start: 2020-10-16 | End: 2020-10-16

## 2020-10-16 NOTE — TELEPHONE ENCOUNTER
Received a call from [facility]. Patient would like the script to say take while awake. The current script states Q 6 hrs scheduled.  Patient would like to take it at 8am, 2pm, and 8pm. Please send in a new script to pharmacy stating the following

## 2020-10-20 ENCOUNTER — MED REC SCAN ONLY (OUTPATIENT)
Dept: FAMILY MEDICINE CLINIC | Facility: CLINIC | Age: 85
End: 2020-10-20

## 2020-11-03 ENCOUNTER — MED REC SCAN ONLY (OUTPATIENT)
Dept: FAMILY MEDICINE CLINIC | Facility: CLINIC | Age: 85
End: 2020-11-03

## 2020-11-09 ENCOUNTER — TELEPHONE (OUTPATIENT)
Dept: FAMILY MEDICINE CLINIC | Facility: CLINIC | Age: 85
End: 2020-11-09

## 2020-11-09 RX ORDER — LEVOFLOXACIN 500 MG/1
500 TABLET, FILM COATED ORAL DAILY
Qty: 10 TABLET | Refills: 0 | Status: SHIPPED | OUTPATIENT
Start: 2020-11-09 | End: 2020-11-19

## 2020-11-09 NOTE — TELEPHONE ENCOUNTER
Patient is requesting the medication she gets every spring and fall for bronchitis. She is not exhibiting any symptoms at this time. Explained this is not something that will be ordered for prophylaxis.

## 2020-11-09 NOTE — TELEPHONE ENCOUNTER
CALLING TO SEE IF ANYONE CALLED FOR HER BRONCHITIS MEDICATION? SHE IS UNSURE IF ANYTHING WAS REQUESTED OF DR TESFAYE OR SENT TO PHARMACY.

## 2020-11-09 NOTE — TELEPHONE ENCOUNTER
Spoke with Dr. Mauri Mendoza. Order placed and information given to Jagdish soria at [address] [place] [address].

## 2020-11-13 ENCOUNTER — TELEPHONE (OUTPATIENT)
Dept: FAMILY MEDICINE CLINIC | Facility: CLINIC | Age: 85
End: 2020-11-13

## 2020-11-13 NOTE — TELEPHONE ENCOUNTER
KYUNG - Pt has been on a antibiotic since the 9th, lungs are clear, not coughing anymore, no complaints today.

## 2020-11-17 ENCOUNTER — TELEPHONE (OUTPATIENT)
Dept: FAMILY MEDICINE CLINIC | Facility: CLINIC | Age: 85
End: 2020-11-17

## 2020-11-17 NOTE — TELEPHONE ENCOUNTER
Patient wants to know why she is on a long quarantine for bronchitis. Spoke to Alli at [facility] she stated patient has a cough and is on quarantine for precautions. Patient advised and she will come off on the 23rd of November.

## 2020-11-30 DIAGNOSIS — M16.12 PRIMARY OSTEOARTHRITIS OF LEFT HIP: ICD-10-CM

## 2020-11-30 RX ORDER — HYDROCODONE BITARTRATE AND ACETAMINOPHEN 5; 325 MG/1; MG/1
1 TABLET ORAL EVERY 8 HOURS PRN
Qty: 90 TABLET | Refills: 0 | Status: SHIPPED | OUTPATIENT
Start: 2020-11-30 | End: 2020-12-01

## 2020-11-30 NOTE — TELEPHONE ENCOUNTER
LOV 09/17/2020    LAST LAB 09/17/2020    LAST RX  Hydrocodone #90 R0 08/04/2020    Next OV No future appointments.       PROTOCOL

## 2020-12-01 DIAGNOSIS — M16.12 PRIMARY OSTEOARTHRITIS OF LEFT HIP: ICD-10-CM

## 2020-12-01 RX ORDER — HYDROCODONE BITARTRATE AND ACETAMINOPHEN 5; 325 MG/1; MG/1
0.5 TABLET ORAL EVERY 8 HOURS
Qty: 90 TABLET | Refills: 0 | Status: SHIPPED | OUTPATIENT
Start: 2020-12-01 | End: 2020-12-24

## 2020-12-01 NOTE — TELEPHONE ENCOUNTER
Wrong prescription was loaded and sent to pharmacy.  Joanna patel called and stated patient was originally prescribed 0.5 tablet q 8 hours scheduled at 6a, 2p, and 10 p

## 2020-12-07 ENCOUNTER — TELEPHONE (OUTPATIENT)
Dept: FAMILY MEDICINE CLINIC | Facility: CLINIC | Age: 85
End: 2020-12-07

## 2020-12-07 NOTE — TELEPHONE ENCOUNTER
REFILL EYE DROPS (SAID THERE IS 2 DIFFERENT EYE DROPS SHE NEEDS, 1 IS REGULAR & 1 IS FOR DRY EYES), SEND TO MARGARITA Ramos

## 2020-12-07 NOTE — TELEPHONE ENCOUNTER
LOV 9/17/20    LAST LAB 11/2/20 bmp, CO2    LAST RX We've never ordered them     Next OV    PROTOCOL    Spoke with [place]), she has used these OTC eye drops for years and has not been receiving them since living @ [facility].  She does not remember the n

## 2020-12-07 NOTE — TELEPHONE ENCOUNTER
Son indicates that one of the eye drops is systane, the other starts with preser something. I was able to find the preser vision and the systane on-line. Son would prefer that they be ordered through the facility instead of [facility].   There are several sy

## 2020-12-08 ENCOUNTER — TELEPHONE (OUTPATIENT)
Dept: FAMILY MEDICINE CLINIC | Facility: CLINIC | Age: 85
End: 2020-12-08

## 2020-12-08 RX ORDER — ANTIOX #8/OM3/DHA/EPA/LUT/ZEAX 250-2.5 MG
1 CAPSULE ORAL DAILY
Qty: 30 CAPSULE | Refills: 0 | Status: SHIPPED | OUTPATIENT
Start: 2020-12-08 | End: 2020-12-08

## 2020-12-08 RX ORDER — POLYETHYLENE GLYCOL 400 AND PROPYLENE GLYCOL 4; 3 MG/ML; MG/ML
1 SOLUTION/ DROPS OPHTHALMIC AS NEEDED
Qty: 1 BOTTLE | Refills: 0 | Status: SHIPPED | OUTPATIENT
Start: 2020-12-08 | End: 2021-01-07

## 2020-12-08 NOTE — TELEPHONE ENCOUNTER
Spoke to Woodrow Gaona She states patient already takes Eye vitamins so does not want to take another. Patient decided to take systane and will pay for over the counter. We are unable to find another eye drop that patient was taking.  Facility will research and get

## 2020-12-08 NOTE — TELEPHONE ENCOUNTER
She can have an order for   systane eye drops at the bedside    Also  She can have  preservision capsules---an oral vitamin for the eyes  At the bedside  For daily use    The preservision is not a drop  So I am not certain if there is another eye drop also

## 2020-12-08 NOTE — TELEPHONE ENCOUNTER
CALLING ABOUT A CHANGE DR MADE TO Multiple Vitamins-Minerals (PRESERVISION AREDS 2), DIRECTIONS SAY TAKE 1 DAILY & MAY KEEP AT BEDSIDE, BUT PT TAKES THIS TWICE DAILY & IT IS IN HER CYCLE PACKET--ALSO ORDER FOR SYSTANE EYE DROPS WAS SENT OVER, IT IS NOT COV

## 2020-12-08 NOTE — TELEPHONE ENCOUNTER
PATIENT TOLD ISI THAT IT IS NOT THE PRESERVISION AREDS EYE VITAMIN DROPS. PLEASE  DISCONTINUE ORDER. ALREADY ON GENERIC VERSION TWICE DAILY. WAS THERE A SECOND EYE DROP SHE WAS USING OTC? UNSURE OF NAME.

## 2020-12-09 ENCOUNTER — TELEPHONE (OUTPATIENT)
Dept: FAMILY MEDICINE CLINIC | Facility: CLINIC | Age: 85
End: 2020-12-09

## 2020-12-10 ENCOUNTER — MED REC SCAN ONLY (OUTPATIENT)
Dept: FAMILY MEDICINE CLINIC | Facility: CLINIC | Age: 85
End: 2020-12-10

## 2020-12-24 DIAGNOSIS — M16.12 PRIMARY OSTEOARTHRITIS OF LEFT HIP: ICD-10-CM

## 2020-12-24 RX ORDER — HYDROCODONE BITARTRATE AND ACETAMINOPHEN 5; 325 MG/1; MG/1
0.5 TABLET ORAL EVERY 8 HOURS
Qty: 90 TABLET | Refills: 0 | Status: SHIPPED | OUTPATIENT
Start: 2020-12-24 | End: 2021-01-23

## 2020-12-24 NOTE — TELEPHONE ENCOUNTER
LOV 09/17/2020 patient lives at [facility]. LAST LAB   12/21/2020-urine  11/02/2020-BMP    LAST RX  Hydrocodone #90 R0 12/01/2020    Next OV No future appointments.       PROTOCOL

## 2021-01-12 ENCOUNTER — PATIENT OUTREACH (OUTPATIENT)
Dept: CASE MANAGEMENT | Age: 86
End: 2021-01-12

## 2021-01-12 NOTE — PROGRESS NOTES
Called pt son Carmen Wolff introduced CCM program. He requested more information be mailed and agreed to a follow up call. Letter generated and CCM flyer mailed to pt son.

## 2021-01-15 ENCOUNTER — PATIENT OUTREACH (OUTPATIENT)
Dept: FAMILY MEDICINE CLINIC | Facility: CLINIC | Age: 86
End: 2021-01-15

## 2021-01-18 ENCOUNTER — MED REC SCAN ONLY (OUTPATIENT)
Dept: FAMILY MEDICINE CLINIC | Facility: CLINIC | Age: 86
End: 2021-01-18

## 2021-01-22 DIAGNOSIS — M16.12 PRIMARY OSTEOARTHRITIS OF LEFT HIP: ICD-10-CM

## 2021-01-23 RX ORDER — HYDROCODONE BITARTRATE AND ACETAMINOPHEN 5; 325 MG/1; MG/1
0.5 TABLET ORAL EVERY 8 HOURS
Qty: 90 TABLET | Refills: 0 | Status: SHIPPED | OUTPATIENT
Start: 2021-01-23 | End: 2021-03-04

## 2021-01-27 DIAGNOSIS — Z23 NEED FOR VACCINATION: ICD-10-CM

## 2021-02-03 ENCOUNTER — PATIENT OUTREACH (OUTPATIENT)
Dept: CASE MANAGEMENT | Age: 86
End: 2021-02-03

## 2021-02-12 NOTE — TELEPHONE ENCOUNTER
Kaplan removed per MD order. Tip intact. No skin breakdown noted. Pt tolerated well. LOV  2020   LABS  2020   HYDROcodone-acetaminophen 5-325 MG Oral Tab 90 tablet 0 2020  gabapentin 100 MG Oral Cap () 540 capsule 0 2019  No future appointments.

## 2021-02-22 ENCOUNTER — MED REC SCAN ONLY (OUTPATIENT)
Dept: FAMILY MEDICINE CLINIC | Facility: CLINIC | Age: 86
End: 2021-02-22

## 2021-02-24 ENCOUNTER — TELEPHONE (OUTPATIENT)
Dept: FAMILY MEDICINE CLINIC | Facility: CLINIC | Age: 86
End: 2021-02-24

## 2021-02-24 ENCOUNTER — MED REC SCAN ONLY (OUTPATIENT)
Dept: FAMILY MEDICINE CLINIC | Facility: CLINIC | Age: 86
End: 2021-02-24

## 2021-02-24 RX ORDER — CEPHALEXIN 250 MG/1
250 CAPSULE ORAL DAILY
Qty: 30 CAPSULE | Refills: 2 | Status: SHIPPED | OUTPATIENT
Start: 2021-02-24 | End: 2021-05-25

## 2021-02-24 RX ORDER — FUROSEMIDE 20 MG/1
TABLET ORAL
COMMUNITY
Start: 2021-02-06 | End: 2021-05-05

## 2021-02-24 NOTE — TELEPHONE ENCOUNTER
[facility] to verify nystop powder to ointment to be covered. Also inquired if patient was on her low dose antibiotic and pro-biotic however pharmacy stated she is not. Will reorder and sent to pharmacy and fax to [facility].

## 2021-02-24 NOTE — TELEPHONE ENCOUNTER
Miranda Rodas we have not received the results from patients urine test that was done 2 days ago. Will call if resulted.

## 2021-03-03 ENCOUNTER — MED REC SCAN ONLY (OUTPATIENT)
Dept: FAMILY MEDICINE CLINIC | Facility: CLINIC | Age: 86
End: 2021-03-03

## 2021-03-03 DIAGNOSIS — M16.12 PRIMARY OSTEOARTHRITIS OF LEFT HIP: ICD-10-CM

## 2021-03-03 NOTE — TELEPHONE ENCOUNTER
[id number] [place] resident. LOV 09/17/2020    LAST LAB 12/21/2020    LAST RX [place] #90 R0 01/23/2021    Next OV No future appointments.       PROTOCOL

## 2021-03-03 NOTE — PROGRESS NOTES
[facility] chest x-ray  Impression: moderate cardiomegaly with early interstitial infiltrate in the left lung base.

## 2021-03-04 RX ORDER — HYDROCODONE BITARTRATE AND ACETAMINOPHEN 5; 325 MG/1; MG/1
0.5 TABLET ORAL EVERY 8 HOURS
Qty: 90 TABLET | Refills: 0 | Status: SHIPPED | OUTPATIENT
Start: 2021-03-04 | End: 2021-03-16

## 2021-03-08 ENCOUNTER — MED REC SCAN ONLY (OUTPATIENT)
Dept: FAMILY MEDICINE CLINIC | Facility: CLINIC | Age: 86
End: 2021-03-08

## 2021-03-16 ENCOUNTER — TELEPHONE (OUTPATIENT)
Dept: FAMILY MEDICINE CLINIC | Facility: CLINIC | Age: 86
End: 2021-03-16

## 2021-03-16 DIAGNOSIS — M16.12 PRIMARY OSTEOARTHRITIS OF LEFT HIP: ICD-10-CM

## 2021-03-16 RX ORDER — HYDROCODONE BITARTRATE AND ACETAMINOPHEN 5; 325 MG/1; MG/1
0.5 TABLET ORAL EVERY 6 HOURS PRN
Qty: 90 TABLET | Refills: 0 | Status: SHIPPED | OUTPATIENT
Start: 2021-03-16 | End: 2021-04-13

## 2021-03-16 NOTE — TELEPHONE ENCOUNTER
Pt. Asking if she can get more pain meds she is having a awful time getting through the day.    Danielle

## 2021-03-16 NOTE — TELEPHONE ENCOUNTER
Advised patient that the pain medication was taking care of with her facility. Patient verbalized understanding.

## 2021-04-05 ENCOUNTER — TELEPHONE (OUTPATIENT)
Dept: FAMILY MEDICINE CLINIC | Facility: CLINIC | Age: 86
End: 2021-04-05

## 2021-04-05 RX ORDER — CIPROFLOXACIN 250 MG/1
250 TABLET, FILM COATED ORAL 2 TIMES DAILY
Qty: 14 TABLET | Refills: 0 | COMMUNITY
Start: 2021-04-05 | End: 2021-04-12

## 2021-04-06 ENCOUNTER — MED REC SCAN ONLY (OUTPATIENT)
Dept: FAMILY MEDICINE CLINIC | Facility: CLINIC | Age: 86
End: 2021-04-06

## 2021-04-08 ENCOUNTER — PATIENT OUTREACH (OUTPATIENT)
Dept: CASE MANAGEMENT | Age: 86
End: 2021-04-08

## 2021-04-08 NOTE — PROGRESS NOTES
Spoke to pt son Dmitriy Russell following up on CCM program if made decision on enrolling pt or not. Dmitriy Russell stated he did not get the information and will stop by the office to pick it up.  Discussed with Dmitriy Russell what the program is again and contacted pcp office to not

## 2021-04-09 ENCOUNTER — MED REC SCAN ONLY (OUTPATIENT)
Dept: FAMILY MEDICINE CLINIC | Facility: CLINIC | Age: 86
End: 2021-04-09

## 2021-04-12 ENCOUNTER — TELEPHONE (OUTPATIENT)
Dept: FAMILY MEDICINE CLINIC | Facility: CLINIC | Age: 86
End: 2021-04-12

## 2021-04-12 DIAGNOSIS — M16.12 PRIMARY OSTEOARTHRITIS OF LEFT HIP: ICD-10-CM

## 2021-04-12 NOTE — TELEPHONE ENCOUNTER
Patient is having trouble having more pain in her legs. She is asking for something for in between the hours of 5 pm and Midnight. She stated she was not sure what her schedule is at the facility.    Patient is asking for an increase or extra pill to help w

## 2021-04-13 RX ORDER — HYDROCODONE BITARTRATE AND ACETAMINOPHEN 5; 325 MG/1; MG/1
TABLET ORAL
Qty: 150 TABLET | Refills: 0 | Status: SHIPPED | OUTPATIENT
Start: 2021-04-13 | End: 2021-05-15

## 2021-04-13 NOTE — TELEPHONE ENCOUNTER
At q6h  She should get up to 4 a day    5p-12mn is 7 hours  So  In theory she could get it    Is this prn q6 but not given at facility q6?

## 2021-04-13 NOTE — TELEPHONE ENCOUNTER
So she does get 6 pm dosing---covers 5-12    But if she feels not adequate try double dose at 6 pm for her pain  No change for the other 3 dosing times

## 2021-04-13 NOTE — TELEPHONE ENCOUNTER
Facility nurse advised patient currently gets Hydrocodone 0.5 tab schedule at 12,6pm, 12 and 6 am  Tylenol 1000 mg at 8am, 2pm, 6pm  Gabapentin TID

## 2021-04-13 NOTE — TELEPHONE ENCOUNTER
Spoke to Sravan Mario and explained what the new order would be for patients Norco. New script sent to [facility] pharmacy.

## 2021-04-26 ENCOUNTER — MED REC SCAN ONLY (OUTPATIENT)
Dept: FAMILY MEDICINE CLINIC | Facility: CLINIC | Age: 86
End: 2021-04-26

## 2021-05-05 RX ORDER — CHOLECALCIFEROL (VITAMIN D3) 50 MCG
TABLET ORAL
Qty: 30 TABLET | Refills: 0 | Status: SHIPPED | OUTPATIENT
Start: 2021-05-05

## 2021-05-05 RX ORDER — FUROSEMIDE 20 MG/1
TABLET ORAL
Qty: 30 TABLET | Refills: 0 | Status: SHIPPED | OUTPATIENT
Start: 2021-05-05

## 2021-05-05 RX ORDER — EVENING PRIMROSE OIL 500 MG
CAPSULE ORAL
Qty: 90 CAPSULE | Refills: 0 | Status: SHIPPED | OUTPATIENT
Start: 2021-05-05 | End: 2021-06-04

## 2021-05-05 RX ORDER — TAMSULOSIN HYDROCHLORIDE 0.4 MG/1
CAPSULE ORAL
Qty: 15 CAPSULE | Refills: 0 | Status: SHIPPED | OUTPATIENT
Start: 2021-05-05

## 2021-05-05 RX ORDER — VIT A/VIT C/VIT E/ZINC/COPPER 2148-113
TABLET ORAL
Qty: 60 TABLET | Refills: 0 | Status: SHIPPED | OUTPATIENT
Start: 2021-05-05

## 2021-05-05 NOTE — TELEPHONE ENCOUNTER
LOV 09/17/2020    LAST LAB 04/13/2021    LAST RX  Vitamin D  tamsulosin  preservision  Furosemide 02/06/2021  Cranberry concentra not from here    Next OV No future appointments.       PROTOCOL  OK by Dr. Libby Brower

## 2021-05-13 ENCOUNTER — PATIENT OUTREACH (OUTPATIENT)
Dept: FAMILY MEDICINE CLINIC | Facility: CLINIC | Age: 86
End: 2021-05-13

## 2021-05-14 DIAGNOSIS — M16.12 PRIMARY OSTEOARTHRITIS OF LEFT HIP: ICD-10-CM

## 2021-05-15 RX ORDER — HYDROCODONE BITARTRATE AND ACETAMINOPHEN 5; 325 MG/1; MG/1
TABLET ORAL
Qty: 75 TABLET | Refills: 0 | Status: SHIPPED | OUTPATIENT
Start: 2021-05-15 | End: 2021-06-11

## 2021-05-15 NOTE — TELEPHONE ENCOUNTER
Sc[facility] and [facility]ER[facility] AND VERIFIED TODAY

## 2021-05-17 ENCOUNTER — MED REC SCAN ONLY (OUTPATIENT)
Dept: FAMILY MEDICINE CLINIC | Facility: CLINIC | Age: 86
End: 2021-05-17

## 2021-06-01 ENCOUNTER — TELEPHONE (OUTPATIENT)
Dept: FAMILY MEDICINE CLINIC | Facility: CLINIC | Age: 86
End: 2021-06-01

## 2021-06-01 NOTE — TELEPHONE ENCOUNTER
Spoke to the Emergency Room  6/1/2021  11:50 AM    2d fall in 2 days    She has uti  And has not started medications yet    She is lucid  But weak and cannot ambulate    Will be admitted to hospital for treatment and also for p-t  and o-t evaluation

## 2021-06-02 ENCOUNTER — TELEPHONE (OUTPATIENT)
Dept: FAMILY MEDICINE CLINIC | Facility: CLINIC | Age: 86
End: 2021-06-02

## 2021-06-02 NOTE — TELEPHONE ENCOUNTER
Facility advised: resident was noted on floor with her back against cabinet in kitchen. Resident states she was walking and her legs gave out.  Patient was c/o neck pain , no visible injury, sent out the ER for eval.

## 2021-06-03 ENCOUNTER — TELEPHONE (OUTPATIENT)
Dept: FAMILY MEDICINE CLINIC | Facility: CLINIC | Age: 86
End: 2021-06-03

## 2021-06-03 NOTE — TELEPHONE ENCOUNTER
Received a fax from [facility] on 5/27/21 stating patient had a fall on 5/26/21. \"Resident was noted on her bathroom floor by toilet. Resident stated that she slid off while trying to use the toilet. No complaints of pain/discomfort.  Vital signs not r

## 2021-06-11 DIAGNOSIS — M16.12 PRIMARY OSTEOARTHRITIS OF LEFT HIP: ICD-10-CM

## 2021-06-11 RX ORDER — HYDROCODONE BITARTRATE AND ACETAMINOPHEN 5; 325 MG/1; MG/1
TABLET ORAL
Qty: 75 TABLET | Refills: 0 | Status: SHIPPED | OUTPATIENT
Start: 2021-06-11 | End: 2021-07-12

## 2021-06-11 NOTE — TELEPHONE ENCOUNTER
Sc[facility] printed and [facility] QUER[facility] AND VERIFIED TODAY

## 2021-06-11 NOTE — TELEPHONE ENCOUNTER
Last OV; 9/17/20  Last refill; 5/15/21 #75 Tablets w/ 0 refills  Requested Prescriptions     Pending Prescriptions Disp Refills   • HYDROCODONE-ACETAMINOPHEN 5-325 MG Oral Tab [Pharmacy Med Name: [place] Levo 5-325-D] 75 tablet 0     Sig: TAKE 1/2 TABLET

## 2021-06-16 ENCOUNTER — TELEPHONE (OUTPATIENT)
Dept: FAMILY MEDICINE CLINIC | Facility: CLINIC | Age: 86
End: 2021-06-16

## 2021-06-16 NOTE — TELEPHONE ENCOUNTER
Joanna patel calls patients son and stated the patient had an abnormal EKG. Facility will be faxing this to Dr. Tammie Krishna. Will advise son when it comes and Dr. Ama Avila suggestions.  Efren Colindres states that patient has made it known that she does not want any s

## 2021-06-18 NOTE — TELEPHONE ENCOUNTER
Román Manjarrez that patients EKG is the Same A-fib and LAFB as prior. If comfortable this is not an indication for an urgent visit. [facility]

## 2021-06-19 ENCOUNTER — TELEPHONE (OUTPATIENT)
Dept: FAMILY MEDICINE CLINIC | Facility: CLINIC | Age: 86
End: 2021-06-19

## 2021-06-19 RX ORDER — LEVOFLOXACIN 250 MG/1
250 TABLET ORAL DAILY
Qty: 7 TABLET | Refills: 0 | Status: SHIPPED
Start: 2021-06-19 | End: 2021-06-26

## 2021-06-19 NOTE — TELEPHONE ENCOUNTER
Start Levaquin 25 mg daily x7 days, would repeat culture at least 48 hours after completing antibiotic therapy

## 2021-06-19 NOTE — TELEPHONE ENCOUNTER
Spoke with patient who states she was started on a medication for UTI 2 days ago. She states she is still experiencing urinary frequency, having to go every 30 minutes. She states she really doesn't feel much improvement.  I do not see any documentation reg

## 2021-06-19 NOTE — TELEPHONE ENCOUNTER
Are you wanting Levaquin 25 mg Oral solution? That is the only 25 I see. Or did you mean 250 mg? Please advise.

## 2021-06-19 NOTE — TELEPHONE ENCOUNTER
Spoke with Nurse Abeba Lara who states patient is not currently on any medication for a U/A. Urine was collected 2 days ago by home health nurse. No culture received yet. Abeba Lara says the U/A looks pretty bad. She is faxing over U/A results right now.  Awaiting fa

## 2021-06-21 ENCOUNTER — MED REC SCAN ONLY (OUTPATIENT)
Dept: FAMILY MEDICINE CLINIC | Facility: CLINIC | Age: 86
End: 2021-06-21

## 2021-06-21 ENCOUNTER — TELEPHONE (OUTPATIENT)
Dept: FAMILY MEDICINE CLINIC | Facility: CLINIC | Age: 86
End: 2021-06-21

## 2021-06-21 NOTE — TELEPHONE ENCOUNTER
Patient was requesting if she needed to make an appointment. Advised that she will have to take 7 days of medication and be rechecked 48 hours after last antibiotic. She verbalized understanding.

## 2021-06-22 ENCOUNTER — TELEPHONE (OUTPATIENT)
Dept: FAMILY MEDICINE CLINIC | Facility: CLINIC | Age: 86
End: 2021-06-22

## 2021-06-22 NOTE — TELEPHONE ENCOUNTER
Daughter unaware that patient called yesterday and this weekend. Advised that patient call regarding UTI and received medication.  Advised that next testing will be on Monday 6/28/21

## 2021-06-22 NOTE — TELEPHONE ENCOUNTER
PATIENT IS STILL HAVING ISSUES WITH URINATING, RITA IS ASKING IF SHE CAN GO ON THE MEDICATION THATT YOU GAVE HER BEFORE?  [address]

## 2021-06-28 ENCOUNTER — MED REC SCAN ONLY (OUTPATIENT)
Dept: FAMILY MEDICINE CLINIC | Facility: CLINIC | Age: 86
End: 2021-06-28

## 2021-06-29 ENCOUNTER — TELEPHONE (OUTPATIENT)
Dept: FAMILY MEDICINE CLINIC | Facility: CLINIC | Age: 86
End: 2021-06-29

## 2021-06-29 NOTE — TELEPHONE ENCOUNTER
Spoke to Mega at [facility]. She states a urine culture was collected today from Patient. Also states patient has reported issues with frequent urination since admission to the facility in September. Patient is on prophylactic for UTI.  She was put on

## 2021-06-29 NOTE — TELEPHONE ENCOUNTER
Spoke to Patient's son Bandar Hernandez. He states patient reports frequent urination every hour. He questioned on when her urine culture was due. Stated facility was doing one today. He also inquired on patients furosemide.  Advised she was to have her culture after

## 2021-06-29 NOTE — TELEPHONE ENCOUNTER
SONJA SAID SHE IS URINATING EVERY HOUR, IT IS MAKING HER WEAK. ANA AND RITA WOULD LIKE TO SPEAK TO THE NURSE. PLEASE CALL THEM.

## 2021-07-01 LAB
AMB EXT BILIRUBIN URINE: NEGATIVE
AMB EXT BLOOD URINE: NEGATIVE
AMB EXT GLUCOSE URINE: NEGATIVE
AMB EXT KETONES URINE: NEGATIVE
AMB EXT NITRITE URINE: NEGATIVE
AMB EXT PH URINE: 6
AMB EXT PROTEIN URINE: NEGATIVE
AMB EXT URINE COLOR: YELLOW
AMB EXT URINE SPECIFIC GRAVITY: 1
AMB EXT WBC URINE: >50

## 2021-07-08 ENCOUNTER — MED REC SCAN ONLY (OUTPATIENT)
Dept: FAMILY MEDICINE CLINIC | Facility: CLINIC | Age: 86
End: 2021-07-08

## 2021-07-12 DIAGNOSIS — M16.12 PRIMARY OSTEOARTHRITIS OF LEFT HIP: ICD-10-CM

## 2021-07-12 RX ORDER — HYDROCODONE BITARTRATE AND ACETAMINOPHEN 5; 325 MG/1; MG/1
TABLET ORAL
Qty: 75 TABLET | Refills: 0 | Status: SHIPPED | OUTPATIENT
Start: 2021-07-12

## 2021-07-12 NOTE — TELEPHONE ENCOUNTER
LOV 09/17/2020      LAST RX  06/11/2021 75 tablets 0 refills    Next OV No future appointments.     PROTOCOL none

## 2021-08-17 ENCOUNTER — TELEPHONE (OUTPATIENT)
Dept: FAMILY MEDICINE CLINIC | Facility: CLINIC | Age: 86
End: 2021-08-17

## 2021-08-17 NOTE — TELEPHONE ENCOUNTER
Patient is now at [address] [place] [address]. Patient is questioning her furosemide that she has been on for months. Patient called from the residents phone and did not inquire about the medication from facility nurse.

## 2021-08-17 NOTE — TELEPHONE ENCOUNTER
PT. CALLING WANTING TO KNOW WHY SHE IS ON WATER PILLS AND DID DR. TESFAYE ORDER THESE. SHE IS GOING TO THE BATHROOM ALL THE TIME.  SHE WAS CALLING FROM [facility] IN [place]

## 2021-08-26 ENCOUNTER — MED REC SCAN ONLY (OUTPATIENT)
Dept: FAMILY MEDICINE CLINIC | Facility: CLINIC | Age: 86
End: 2021-08-26

## 2021-09-16 ENCOUNTER — TELEPHONE (OUTPATIENT)
Dept: FAMILY MEDICINE CLINIC | Facility: CLINIC | Age: 86
End: 2021-09-16

## 2021-09-16 NOTE — TELEPHONE ENCOUNTER
Spoke with Charly at [facility] and gave dates on the following immunizations:  Prevnar 13, pneumovax 23 and flu.

## 2023-01-20 ENCOUNTER — TELEPHONE (OUTPATIENT)
Dept: FAMILY MEDICINE CLINIC | Facility: CLINIC | Age: 88
End: 2023-01-20

## 2024-03-02 NOTE — TELEPHONE ENCOUNTER
Gabapentin  Call to [facility] in [place]    Also would like to talk to the nurse
Last OV: 9/21/2018  Last filled: 6/22/2018 #180 w/ 5RF    Pt wanted to know what DME providers were available. Advised pt to contact medicare and see who her DME provider is prior.  Patient notified and verbalized understanding of the information provided
IV intact

## (undated) NOTE — MR AVS SNAPSHOT
[address] [place] [address] [phone]               Thank you for choosing us for your health care visit with Florentin Montanez MD.  We are glad to serve you and happy to provide you with this summary o Cardiovascular Disease Screening     Cholesterol, covered every 5 yrs including Total, LDL and Trigs No results found for: LDL, LDLC, CHOLEST, TRIG     EKG - covered if needed at [place] to [facility], and non-screening if indicated for medical reasons El Pap and Pelvic      Pap: Every 3 yrs age 21-65 or Pap+HPV every 5 yrs age 33-67, Covered every 2 yrs up to age 79 or Yearly if High Risk   There are no preventive care reminders to display for this patient.      Chlamydia  Annually if high risk No results f An information packet, including necessary form from the [employer] website. http://www. idph.state. [place]/public/books/advin.htm  A link to the [employer].  This site has a lot of good information including definit Enjoy your food, but eat less. Fully enjoy your food when eating. Don’t eat while distracted and slow down. Avoid over sized portions. Don’t eat while when you’re bored.      EAT THESE FOODS MORE OFTEN: EAT THESE FOODS LESS OFTEN:   Make half your pl

## (undated) NOTE — LETTER
[facility] [address] [address]  [phone]              10/10/20    [facility] 1925    Natasha may take OTC cranberry tablets 1 po daily and AREDS (eye vitamin) 1

## (undated) NOTE — MR AVS SNAPSHOT
[address] [address] [phone]               Thank you for choosing us for your health care visit with Lexi Fonseca MD.  We are glad to serve you and happy to provide you with this summary o Side effects that you should report to your doctor or health care professional as soon as possible:  · allergic reactions like skin rash, itching or hives, swelling of the face, lips, or tongue  · worsening of mood, thoughts or actions of suicide or dying · suicidal thoughts, plans, or attempt; a previous suicide attempt by you or a family member  · an unusual or allergic reaction to gabapentin, other medicines, foods, dyes, or preservatives  · pregnant or trying to get pregnant  · breast-feeding  What miguel ángel [phone]. This registry collects information about the safety of antiepileptic drug use during pregnancy. Date Last Reviewed:   NOTE:This sheet is a summary. It may not cover all possible information.  If you have questions about this medicine, talk

## (undated) NOTE — MR AVS SNAPSHOT
[facility]  [address] [facility] [address]  [phone]               Thank you for choosing us for your health care visit with Seema Platt MD.  We are glad to serve you and happy to provide you with this summary o · You may use acetaminophen or ibuprofen to control fever or pain, unless another medicine was prescribed. If you have chronic liver or kidney disease, talk with your health care provider before using these medicines.  Also talk with your provider if [facility] © 5826-0325 [address], [address]. All rights reserved. This information is not intended as a substitute for professional medical care. Always follow your healthcare professional's instructions.              Al Weight Reduction Maintain normal body weight (body mass index 18.5-24.9 kg/m2)   DASH eating plan Adopt a diet rich in fruits, vegetables, and low fat dairy products with reduced content of saturated and total fat.    Dietary sodium reduction Reduce dietary

## (undated) NOTE — MR AVS SNAPSHOT
[facility]  [address] [facility] [address]  [phone]               Thank you for choosing us for your health care visit with Lexi Fonseca MD.  We are glad to serve you and happy to provide you with this summary o hot water. Or,  the shower and direct the hot spray onto your face. Using a vaporizer along with a menthol rub at night may also help.   · An expectorant containing guaifenesin may help thin the mucus and promote drainage from the sinuses.   · Over- · Fever of 100.4ºF (38ºC) or higher, or as directed by your healthcare provider  · Seizure  · Breathing problems  · Symptoms not resolving within 10 days  © 0139-2549 [address], [address] [place] [address].  All rights reserved You can get these medications from any pharmacy     Bring a paper prescription for each of these medications    - HYDROcodone-acetaminophen 5-325 MG Tabs            Zeke                  Visit [facility] online at  [email or web address]